# Patient Record
Sex: FEMALE | Race: WHITE | Employment: FULL TIME | URBAN - METROPOLITAN AREA
[De-identification: names, ages, dates, MRNs, and addresses within clinical notes are randomized per-mention and may not be internally consistent; named-entity substitution may affect disease eponyms.]

---

## 2021-09-22 ENCOUNTER — APPOINTMENT (OUTPATIENT)
Dept: GENERAL RADIOLOGY | Age: 41
DRG: 177 | End: 2021-09-22
Payer: COMMERCIAL

## 2021-09-22 ENCOUNTER — HOSPITAL ENCOUNTER (INPATIENT)
Age: 41
LOS: 2 days | Discharge: HOME | DRG: 177 | End: 2021-09-24
Attending: EMERGENCY MEDICINE | Admitting: INTERNAL MEDICINE
Payer: COMMERCIAL

## 2021-09-22 DIAGNOSIS — J96.01 ACUTE RESPIRATORY FAILURE WITH HYPOXIA (HCC): Primary | ICD-10-CM

## 2021-09-22 DIAGNOSIS — J81.0 ACUTE PULMONARY EDEMA (HCC): ICD-10-CM

## 2021-09-22 DIAGNOSIS — E87.1 HYPONATREMIA: ICD-10-CM

## 2021-09-22 DIAGNOSIS — U07.1 COVID-19 VIRUS INFECTION: ICD-10-CM

## 2021-09-22 DIAGNOSIS — R74.01 TRANSAMINITIS: ICD-10-CM

## 2021-09-22 LAB
A/G RATIO: 1.3 (ref 1.1–2.2)
ALBUMIN SERPL-MCNC: 4.3 G/DL (ref 3.4–5)
ALP BLD-CCNC: 57 U/L (ref 40–129)
ALT SERPL-CCNC: 48 U/L (ref 10–40)
ANION GAP SERPL CALCULATED.3IONS-SCNC: 14 MMOL/L (ref 3–16)
AST SERPL-CCNC: 76 U/L (ref 15–37)
BASE EXCESS VENOUS: -3.1 MMOL/L (ref -3–3)
BASOPHILS ABSOLUTE: 0.1 K/UL (ref 0–0.2)
BASOPHILS RELATIVE PERCENT: 0.8 %
BILIRUB SERPL-MCNC: <0.2 MG/DL (ref 0–1)
BUN BLDV-MCNC: 10 MG/DL (ref 7–20)
CALCIUM SERPL-MCNC: 9.2 MG/DL (ref 8.3–10.6)
CARBOXYHEMOGLOBIN: 1.7 % (ref 0–1.5)
CHLORIDE BLD-SCNC: 91 MMOL/L (ref 99–110)
CO2: 22 MMOL/L (ref 21–32)
CREAT SERPL-MCNC: 0.8 MG/DL (ref 0.6–1.1)
EOSINOPHILS ABSOLUTE: 0 K/UL (ref 0–0.6)
EOSINOPHILS RELATIVE PERCENT: 0.2 %
GFR AFRICAN AMERICAN: >60
GFR NON-AFRICAN AMERICAN: >60
GLOBULIN: 3.2 G/DL
GLUCOSE BLD-MCNC: 184 MG/DL (ref 70–99)
HCO3 VENOUS: 24.2 MMOL/L (ref 23–29)
HCT VFR BLD CALC: 38.9 % (ref 36–48)
HEMOGLOBIN: 13.2 G/DL (ref 12–16)
LYMPHOCYTES ABSOLUTE: 1 K/UL (ref 1–5.1)
LYMPHOCYTES RELATIVE PERCENT: 10.6 %
MCH RBC QN AUTO: 31.5 PG (ref 26–34)
MCHC RBC AUTO-ENTMCNC: 33.9 G/DL (ref 31–36)
MCV RBC AUTO: 93 FL (ref 80–100)
METHEMOGLOBIN VENOUS: 0.4 %
MONOCYTES ABSOLUTE: 0.6 K/UL (ref 0–1.3)
MONOCYTES RELATIVE PERCENT: 5.7 %
NEUTROPHILS ABSOLUTE: 8.1 K/UL (ref 1.7–7.7)
NEUTROPHILS RELATIVE PERCENT: 82.7 %
O2 SAT, VEN: 51 %
O2 THERAPY: ABNORMAL
PCO2, VEN: 52 MMHG (ref 40–50)
PDW BLD-RTO: 14.1 % (ref 12.4–15.4)
PH VENOUS: 7.29 (ref 7.35–7.45)
PLATELET # BLD: 245 K/UL (ref 135–450)
PMV BLD AUTO: 8.5 FL (ref 5–10.5)
PO2, VEN: 30.4 MMHG (ref 25–40)
POTASSIUM REFLEX MAGNESIUM: 4.5 MMOL/L (ref 3.5–5.1)
PRO-BNP: 4602 PG/ML (ref 0–124)
PROCALCITONIN: 0.09 NG/ML (ref 0–0.15)
RBC # BLD: 4.19 M/UL (ref 4–5.2)
SARS-COV-2, NAAT: DETECTED
SODIUM BLD-SCNC: 127 MMOL/L (ref 136–145)
TCO2 CALC VENOUS: 26 MMOL/L
TOTAL PROTEIN: 7.5 G/DL (ref 6.4–8.2)
TROPONIN: 0.01 NG/ML
WBC # BLD: 9.8 K/UL (ref 4–11)

## 2021-09-22 PROCEDURE — 93005 ELECTROCARDIOGRAM TRACING: CPT | Performed by: EMERGENCY MEDICINE

## 2021-09-22 PROCEDURE — 96374 THER/PROPH/DIAG INJ IV PUSH: CPT

## 2021-09-22 PROCEDURE — 87635 SARS-COV-2 COVID-19 AMP PRB: CPT

## 2021-09-22 PROCEDURE — 83880 ASSAY OF NATRIURETIC PEPTIDE: CPT

## 2021-09-22 PROCEDURE — 80053 COMPREHEN METABOLIC PANEL: CPT

## 2021-09-22 PROCEDURE — 96375 TX/PRO/DX INJ NEW DRUG ADDON: CPT

## 2021-09-22 PROCEDURE — 2060000000 HC ICU INTERMEDIATE R&B

## 2021-09-22 PROCEDURE — 6360000002 HC RX W HCPCS: Performed by: INTERNAL MEDICINE

## 2021-09-22 PROCEDURE — 84484 ASSAY OF TROPONIN QUANT: CPT

## 2021-09-22 PROCEDURE — 94761 N-INVAS EAR/PLS OXIMETRY MLT: CPT

## 2021-09-22 PROCEDURE — 6360000002 HC RX W HCPCS: Performed by: PHYSICIAN ASSISTANT

## 2021-09-22 PROCEDURE — 2700000000 HC OXYGEN THERAPY PER DAY

## 2021-09-22 PROCEDURE — 82803 BLOOD GASES ANY COMBINATION: CPT

## 2021-09-22 PROCEDURE — 6360000002 HC RX W HCPCS

## 2021-09-22 PROCEDURE — 85025 COMPLETE CBC W/AUTO DIFF WBC: CPT

## 2021-09-22 PROCEDURE — 84703 CHORIONIC GONADOTROPIN ASSAY: CPT

## 2021-09-22 PROCEDURE — 2580000003 HC RX 258: Performed by: PHYSICIAN ASSISTANT

## 2021-09-22 PROCEDURE — 36415 COLL VENOUS BLD VENIPUNCTURE: CPT

## 2021-09-22 PROCEDURE — 84145 PROCALCITONIN (PCT): CPT

## 2021-09-22 PROCEDURE — 71045 X-RAY EXAM CHEST 1 VIEW: CPT

## 2021-09-22 PROCEDURE — 83036 HEMOGLOBIN GLYCOSYLATED A1C: CPT

## 2021-09-22 PROCEDURE — 99285 EMERGENCY DEPT VISIT HI MDM: CPT

## 2021-09-22 PROCEDURE — 6370000000 HC RX 637 (ALT 250 FOR IP): Performed by: PHYSICIAN ASSISTANT

## 2021-09-22 RX ORDER — POTASSIUM CHLORIDE 7.45 MG/ML
10 INJECTION INTRAVENOUS PRN
Status: DISCONTINUED | OUTPATIENT
Start: 2021-09-22 | End: 2021-09-24 | Stop reason: HOSPADM

## 2021-09-22 RX ORDER — ACETAMINOPHEN 500 MG
1000 TABLET ORAL ONCE
Status: COMPLETED | OUTPATIENT
Start: 2021-09-22 | End: 2021-09-22

## 2021-09-22 RX ORDER — LORAZEPAM 2 MG/ML
0.5 INJECTION INTRAMUSCULAR ONCE
Status: DISCONTINUED | OUTPATIENT
Start: 2021-09-22 | End: 2021-09-22

## 2021-09-22 RX ORDER — HYDROXYZINE HYDROCHLORIDE 50 MG/ML
50 INJECTION, SOLUTION INTRAMUSCULAR ONCE
Status: COMPLETED | OUTPATIENT
Start: 2021-09-23 | End: 2021-09-23

## 2021-09-22 RX ORDER — DEXTROSE MONOHYDRATE 50 MG/ML
100 INJECTION, SOLUTION INTRAVENOUS PRN
Status: DISCONTINUED | OUTPATIENT
Start: 2021-09-22 | End: 2021-09-24 | Stop reason: HOSPADM

## 2021-09-22 RX ORDER — ZINC SULFATE 50(220)MG
50 CAPSULE ORAL DAILY
COMMUNITY

## 2021-09-22 RX ORDER — 0.9 % SODIUM CHLORIDE 0.9 %
1000 INTRAVENOUS SOLUTION INTRAVENOUS ONCE
Status: DISCONTINUED | OUTPATIENT
Start: 2021-09-22 | End: 2021-09-22

## 2021-09-22 RX ORDER — FUROSEMIDE 10 MG/ML
80 INJECTION INTRAMUSCULAR; INTRAVENOUS ONCE
Status: DISCONTINUED | OUTPATIENT
Start: 2021-09-23 | End: 2021-09-23

## 2021-09-22 RX ORDER — METHYLPREDNISOLONE SODIUM SUCCINATE 125 MG/2ML
60 INJECTION, POWDER, LYOPHILIZED, FOR SOLUTION INTRAMUSCULAR; INTRAVENOUS ONCE
Status: COMPLETED | OUTPATIENT
Start: 2021-09-22 | End: 2021-09-22

## 2021-09-22 RX ORDER — NICOTINE POLACRILEX 4 MG
15 LOZENGE BUCCAL PRN
Status: DISCONTINUED | OUTPATIENT
Start: 2021-09-22 | End: 2021-09-24 | Stop reason: HOSPADM

## 2021-09-22 RX ORDER — PROMETHAZINE HYDROCHLORIDE 25 MG/1
12.5 TABLET ORAL EVERY 6 HOURS PRN
Status: DISCONTINUED | OUTPATIENT
Start: 2021-09-22 | End: 2021-09-24 | Stop reason: HOSPADM

## 2021-09-22 RX ORDER — NITROGLYCERIN 0.4 MG/1
0.4 TABLET SUBLINGUAL ONCE
Status: DISCONTINUED | OUTPATIENT
Start: 2021-09-22 | End: 2021-09-23

## 2021-09-22 RX ORDER — FUROSEMIDE 10 MG/ML
40 INJECTION INTRAMUSCULAR; INTRAVENOUS ONCE
Status: COMPLETED | OUTPATIENT
Start: 2021-09-22 | End: 2021-09-22

## 2021-09-22 RX ORDER — ONDANSETRON 2 MG/ML
4 INJECTION INTRAMUSCULAR; INTRAVENOUS EVERY 6 HOURS PRN
Status: DISCONTINUED | OUTPATIENT
Start: 2021-09-22 | End: 2021-09-24 | Stop reason: HOSPADM

## 2021-09-22 RX ORDER — DEXAMETHASONE SODIUM PHOSPHATE 10 MG/ML
6 INJECTION INTRAMUSCULAR; INTRAVENOUS EVERY 24 HOURS
Status: DISCONTINUED | OUTPATIENT
Start: 2021-09-23 | End: 2021-09-23

## 2021-09-22 RX ORDER — GUAIFENESIN/DEXTROMETHORPHAN 100-10MG/5
5 SYRUP ORAL EVERY 4 HOURS PRN
Status: DISCONTINUED | OUTPATIENT
Start: 2021-09-22 | End: 2021-09-24 | Stop reason: HOSPADM

## 2021-09-22 RX ORDER — VITAMIN B COMPLEX
2000 TABLET ORAL DAILY
Status: DISCONTINUED | OUTPATIENT
Start: 2021-09-23 | End: 2021-09-24 | Stop reason: HOSPADM

## 2021-09-22 RX ORDER — SODIUM CHLORIDE 9 MG/ML
25 INJECTION, SOLUTION INTRAVENOUS PRN
Status: DISCONTINUED | OUTPATIENT
Start: 2021-09-22 | End: 2021-09-24 | Stop reason: HOSPADM

## 2021-09-22 RX ORDER — BENZONATATE 100 MG/1
200 CAPSULE ORAL 3 TIMES DAILY
Status: DISCONTINUED | OUTPATIENT
Start: 2021-09-22 | End: 2021-09-24 | Stop reason: HOSPADM

## 2021-09-22 RX ORDER — SODIUM CHLORIDE 0.9 % (FLUSH) 0.9 %
10 SYRINGE (ML) INJECTION PRN
Status: DISCONTINUED | OUTPATIENT
Start: 2021-09-22 | End: 2021-09-24 | Stop reason: HOSPADM

## 2021-09-22 RX ORDER — DEXTROSE MONOHYDRATE 25 G/50ML
12.5 INJECTION, SOLUTION INTRAVENOUS PRN
Status: DISCONTINUED | OUTPATIENT
Start: 2021-09-22 | End: 2021-09-24 | Stop reason: HOSPADM

## 2021-09-22 RX ORDER — MAGNESIUM SULFATE IN WATER 40 MG/ML
2000 INJECTION, SOLUTION INTRAVENOUS PRN
Status: DISCONTINUED | OUTPATIENT
Start: 2021-09-22 | End: 2021-09-24 | Stop reason: HOSPADM

## 2021-09-22 RX ORDER — SODIUM CHLORIDE 0.9 % (FLUSH) 0.9 %
10 SYRINGE (ML) INJECTION EVERY 12 HOURS SCHEDULED
Status: DISCONTINUED | OUTPATIENT
Start: 2021-09-22 | End: 2021-09-24 | Stop reason: HOSPADM

## 2021-09-22 RX ORDER — MORPHINE SULFATE 2 MG/ML
INJECTION, SOLUTION INTRAMUSCULAR; INTRAVENOUS
Status: COMPLETED
Start: 2021-09-22 | End: 2021-09-22

## 2021-09-22 RX ADMIN — FUROSEMIDE 40 MG: 10 INJECTION, SOLUTION INTRAMUSCULAR; INTRAVENOUS at 23:33

## 2021-09-22 RX ADMIN — SODIUM CHLORIDE 1000 ML: 9 INJECTION, SOLUTION INTRAVENOUS at 22:24

## 2021-09-22 RX ADMIN — MORPHINE SULFATE 2 MG: 2 INJECTION, SOLUTION INTRAMUSCULAR; INTRAVENOUS at 23:51

## 2021-09-22 RX ADMIN — METHYLPREDNISOLONE SODIUM SUCCINATE 60 MG: 125 INJECTION, POWDER, FOR SOLUTION INTRAMUSCULAR; INTRAVENOUS at 22:26

## 2021-09-22 RX ADMIN — ACETAMINOPHEN 1000 MG: 500 TABLET ORAL at 22:25

## 2021-09-22 ASSESSMENT — PAIN SCALES - GENERAL
PAINLEVEL_OUTOF10: 0
PAINLEVEL_OUTOF10: 0

## 2021-09-22 NOTE — Clinical Note
Patient Class: Inpatient [101]   REQUIRED: Diagnosis: Acute respiratory failure with hypoxia ([facility] [address] [[id number]]   Estimated Length of Stay: Estimated stay of more than 2 midnights   Admitting Provider: Jana Stanford [[id number]]   Telemetry/Cardiac Monitoring Required?: Yes

## 2021-09-23 ENCOUNTER — APPOINTMENT (OUTPATIENT)
Dept: CT IMAGING | Age: 41
DRG: 177 | End: 2021-09-23
Payer: COMMERCIAL

## 2021-09-23 LAB
A/G RATIO: 1.1 (ref 1.1–2.2)
A/G RATIO: 1.1 (ref 1.1–2.2)
ALBUMIN SERPL-MCNC: 3.4 G/DL (ref 3.4–5)
ALBUMIN SERPL-MCNC: 3.4 G/DL (ref 3.4–5)
ALP BLD-CCNC: 54 U/L (ref 40–129)
ALP BLD-CCNC: 55 U/L (ref 40–129)
ALT SERPL-CCNC: 79 U/L (ref 10–40)
ALT SERPL-CCNC: 79 U/L (ref 10–40)
ANION GAP SERPL CALCULATED.3IONS-SCNC: 10 MMOL/L (ref 3–16)
ANION GAP SERPL CALCULATED.3IONS-SCNC: 11 MMOL/L (ref 3–16)
ANION GAP SERPL CALCULATED.3IONS-SCNC: 11 MMOL/L (ref 3–16)
ANION GAP SERPL CALCULATED.3IONS-SCNC: 9 MMOL/L (ref 3–16)
APTT: 34.9 SEC (ref 26.2–38.6)
AST SERPL-CCNC: 98 U/L (ref 15–37)
AST SERPL-CCNC: 99 U/L (ref 15–37)
BASE EXCESS VENOUS: -2 MMOL/L (ref -3–3)
BASOPHILS ABSOLUTE: 0 K/UL (ref 0–0.2)
BASOPHILS ABSOLUTE: 0 K/UL (ref 0–0.2)
BASOPHILS RELATIVE PERCENT: 0.2 %
BASOPHILS RELATIVE PERCENT: 0.2 %
BILIRUB SERPL-MCNC: <0.2 MG/DL (ref 0–1)
BILIRUB SERPL-MCNC: <0.2 MG/DL (ref 0–1)
BUN BLDV-MCNC: 15 MG/DL (ref 7–20)
BUN BLDV-MCNC: 15 MG/DL (ref 7–20)
BUN BLDV-MCNC: 16 MG/DL (ref 7–20)
BUN BLDV-MCNC: 20 MG/DL (ref 7–20)
C-REACTIVE PROTEIN: 13.6 MG/L (ref 0–5.1)
CALCIUM SERPL-MCNC: 8.1 MG/DL (ref 8.3–10.6)
CALCIUM SERPL-MCNC: 8.2 MG/DL (ref 8.3–10.6)
CARBOXYHEMOGLOBIN: 1.3 % (ref 0–1.5)
CHLORIDE BLD-SCNC: 91 MMOL/L (ref 99–110)
CHLORIDE BLD-SCNC: 95 MMOL/L (ref 99–110)
CHLORIDE BLD-SCNC: 95 MMOL/L (ref 99–110)
CHLORIDE BLD-SCNC: 96 MMOL/L (ref 99–110)
CO2: 23 MMOL/L (ref 21–32)
CO2: 23 MMOL/L (ref 21–32)
CO2: 24 MMOL/L (ref 21–32)
CO2: 24 MMOL/L (ref 21–32)
CREAT SERPL-MCNC: 0.8 MG/DL (ref 0.6–1.1)
CREAT SERPL-MCNC: 0.8 MG/DL (ref 0.6–1.1)
CREAT SERPL-MCNC: 0.9 MG/DL (ref 0.6–1.1)
CREAT SERPL-MCNC: 0.9 MG/DL (ref 0.6–1.1)
D DIMER: 623 NG/ML DDU (ref 0–229)
EKG ATRIAL RATE: 131 BPM
EKG ATRIAL RATE: 90 BPM
EKG DIAGNOSIS: NORMAL
EKG DIAGNOSIS: NORMAL
EKG P AXIS: 77 DEGREES
EKG P AXIS: 79 DEGREES
EKG P-R INTERVAL: 118 MS
EKG P-R INTERVAL: 124 MS
EKG Q-T INTERVAL: 300 MS
EKG Q-T INTERVAL: 420 MS
EKG QRS DURATION: 70 MS
EKG QRS DURATION: 82 MS
EKG QTC CALCULATION (BAZETT): 443 MS
EKG QTC CALCULATION (BAZETT): 513 MS
EKG R AXIS: 66 DEGREES
EKG R AXIS: 72 DEGREES
EKG T AXIS: -74 DEGREES
EKG T AXIS: 125 DEGREES
EKG VENTRICULAR RATE: 131 BPM
EKG VENTRICULAR RATE: 90 BPM
EOSINOPHILS ABSOLUTE: 0 K/UL (ref 0–0.6)
EOSINOPHILS ABSOLUTE: 0 K/UL (ref 0–0.6)
EOSINOPHILS RELATIVE PERCENT: 0 %
EOSINOPHILS RELATIVE PERCENT: 0 %
ESTIMATED AVERAGE GLUCOSE: 125.5 MG/DL
FERRITIN: 74.1 NG/ML (ref 15–150)
FIBRINOGEN: 345 MG/DL (ref 200–397)
GFR AFRICAN AMERICAN: >60
GFR NON-AFRICAN AMERICAN: >60
GLOBULIN: 3 G/DL
GLOBULIN: 3 G/DL
GLUCOSE BLD-MCNC: 150 MG/DL (ref 70–99)
GLUCOSE BLD-MCNC: 200 MG/DL (ref 70–99)
GLUCOSE BLD-MCNC: 206 MG/DL (ref 70–99)
GLUCOSE BLD-MCNC: 208 MG/DL (ref 70–99)
GLUCOSE BLD-MCNC: 210 MG/DL (ref 70–99)
GLUCOSE BLD-MCNC: 211 MG/DL (ref 70–99)
GLUCOSE BLD-MCNC: 215 MG/DL (ref 70–99)
GLUCOSE BLD-MCNC: 235 MG/DL (ref 70–99)
HBA1C MFR BLD: 6 %
HCG QUALITATIVE: NEGATIVE
HCO3 VENOUS: 23.9 MMOL/L (ref 23–29)
HCT VFR BLD CALC: 36.8 % (ref 36–48)
HCT VFR BLD CALC: 37.2 % (ref 36–48)
HEMOGLOBIN: 12.2 G/DL (ref 12–16)
HEMOGLOBIN: 12.4 G/DL (ref 12–16)
INR BLD: 1.02 (ref 0.88–1.12)
L. PNEUMOPHILA SEROGP 1 UR AG: NORMAL
LACTATE DEHYDROGENASE: 333 U/L (ref 100–190)
LACTIC ACID: 2 MMOL/L (ref 0.4–2)
LYMPHOCYTES ABSOLUTE: 0.5 K/UL (ref 1–5.1)
LYMPHOCYTES ABSOLUTE: 0.5 K/UL (ref 1–5.1)
LYMPHOCYTES RELATIVE PERCENT: 6.2 %
LYMPHOCYTES RELATIVE PERCENT: 6.3 %
MCH RBC QN AUTO: 31.3 PG (ref 26–34)
MCH RBC QN AUTO: 31.4 PG (ref 26–34)
MCHC RBC AUTO-ENTMCNC: 33.3 G/DL (ref 31–36)
MCHC RBC AUTO-ENTMCNC: 33.4 G/DL (ref 31–36)
MCV RBC AUTO: 93.6 FL (ref 80–100)
MCV RBC AUTO: 94.3 FL (ref 80–100)
METHEMOGLOBIN VENOUS: 0.7 %
MONOCYTES ABSOLUTE: 0.2 K/UL (ref 0–1.3)
MONOCYTES ABSOLUTE: 0.2 K/UL (ref 0–1.3)
MONOCYTES RELATIVE PERCENT: 2.7 %
MONOCYTES RELATIVE PERCENT: 3.1 %
NEUTROPHILS ABSOLUTE: 7.4 K/UL (ref 1.7–7.7)
NEUTROPHILS ABSOLUTE: 7.6 K/UL (ref 1.7–7.7)
NEUTROPHILS RELATIVE PERCENT: 90.5 %
NEUTROPHILS RELATIVE PERCENT: 90.8 %
O2 SAT, VEN: 89 %
O2 THERAPY: ABNORMAL
PCO2, VEN: 45 MMHG (ref 40–50)
PDW BLD-RTO: 14.2 % (ref 12.4–15.4)
PDW BLD-RTO: 14.3 % (ref 12.4–15.4)
PERFORMED ON: ABNORMAL
PH VENOUS: 7.34 (ref 7.35–7.45)
PLATELET # BLD: 199 K/UL (ref 135–450)
PLATELET # BLD: 203 K/UL (ref 135–450)
PMV BLD AUTO: 7.9 FL (ref 5–10.5)
PMV BLD AUTO: 8.1 FL (ref 5–10.5)
PO2, VEN: 57.6 MMHG (ref 25–40)
POTASSIUM REFLEX MAGNESIUM: 4.9 MMOL/L (ref 3.5–5.1)
POTASSIUM REFLEX MAGNESIUM: 5 MMOL/L (ref 3.5–5.1)
POTASSIUM SERPL-SCNC: 4.4 MMOL/L (ref 3.5–5.1)
POTASSIUM SERPL-SCNC: 4.6 MMOL/L (ref 3.5–5.1)
PROCALCITONIN: 0.1 NG/ML (ref 0–0.15)
PROCALCITONIN: 2.31 NG/ML (ref 0–0.15)
PROTHROMBIN TIME: 11.5 SEC (ref 9.9–12.7)
RBC # BLD: 3.9 M/UL (ref 4–5.2)
RBC # BLD: 3.97 M/UL (ref 4–5.2)
SODIUM BLD-SCNC: 125 MMOL/L (ref 136–145)
SODIUM BLD-SCNC: 128 MMOL/L (ref 136–145)
SODIUM BLD-SCNC: 129 MMOL/L (ref 136–145)
SODIUM BLD-SCNC: 130 MMOL/L (ref 136–145)
STREP PNEUMONIAE ANTIGEN, URINE: NORMAL
TCO2 CALC VENOUS: 25 MMOL/L
TOTAL PROTEIN: 6.4 G/DL (ref 6.4–8.2)
TOTAL PROTEIN: 6.4 G/DL (ref 6.4–8.2)
TROPONIN: 0.02 NG/ML
TROPONIN: 0.03 NG/ML
TROPONIN: 0.04 NG/ML
WBC # BLD: 8.1 K/UL (ref 4–11)
WBC # BLD: 8.4 K/UL (ref 4–11)

## 2021-09-23 PROCEDURE — 85384 FIBRINOGEN ACTIVITY: CPT

## 2021-09-23 PROCEDURE — 93010 ELECTROCARDIOGRAM REPORT: CPT | Performed by: INTERNAL MEDICINE

## 2021-09-23 PROCEDURE — 2580000003 HC RX 258: Performed by: INTERNAL MEDICINE

## 2021-09-23 PROCEDURE — 83605 ASSAY OF LACTIC ACID: CPT

## 2021-09-23 PROCEDURE — 6360000002 HC RX W HCPCS: Performed by: INTERNAL MEDICINE

## 2021-09-23 PROCEDURE — 6370000000 HC RX 637 (ALT 250 FOR IP)

## 2021-09-23 PROCEDURE — 83615 LACTATE (LD) (LDH) ENZYME: CPT

## 2021-09-23 PROCEDURE — 85379 FIBRIN DEGRADATION QUANT: CPT

## 2021-09-23 PROCEDURE — 8E0ZXY6 ISOLATION: ICD-10-PCS

## 2021-09-23 PROCEDURE — 86140 C-REACTIVE PROTEIN: CPT

## 2021-09-23 PROCEDURE — 6370000000 HC RX 637 (ALT 250 FOR IP): Performed by: INTERNAL MEDICINE

## 2021-09-23 PROCEDURE — 36415 COLL VENOUS BLD VENIPUNCTURE: CPT

## 2021-09-23 PROCEDURE — XW033E5 INTRODUCTION OF REMDESIVIR ANTI-INFECTIVE INTO PERIPHERAL VEIN, PERCUTANEOUS APPROACH, NEW TECHNOLOGY GROUP 5: ICD-10-PCS

## 2021-09-23 PROCEDURE — 6370000000 HC RX 637 (ALT 250 FOR IP): Performed by: NURSE PRACTITIONER

## 2021-09-23 PROCEDURE — 85730 THROMBOPLASTIN TIME PARTIAL: CPT

## 2021-09-23 PROCEDURE — 82306 VITAMIN D 25 HYDROXY: CPT

## 2021-09-23 PROCEDURE — 94761 N-INVAS EAR/PLS OXIMETRY MLT: CPT

## 2021-09-23 PROCEDURE — 85610 PROTHROMBIN TIME: CPT

## 2021-09-23 PROCEDURE — 1200000000 HC SEMI PRIVATE

## 2021-09-23 PROCEDURE — 84145 PROCALCITONIN (PCT): CPT

## 2021-09-23 PROCEDURE — 82803 BLOOD GASES ANY COMBINATION: CPT

## 2021-09-23 PROCEDURE — 94660 CPAP INITIATION&MGMT: CPT

## 2021-09-23 PROCEDURE — 6360000004 HC RX CONTRAST MEDICATION: Performed by: EMERGENCY MEDICINE

## 2021-09-23 PROCEDURE — 71260 CT THORAX DX C+: CPT

## 2021-09-23 PROCEDURE — 80053 COMPREHEN METABOLIC PANEL: CPT

## 2021-09-23 PROCEDURE — 99254 IP/OBS CNSLTJ NEW/EST MOD 60: CPT | Performed by: INTERNAL MEDICINE

## 2021-09-23 PROCEDURE — 87449 NOS EACH ORGANISM AG IA: CPT

## 2021-09-23 PROCEDURE — 93005 ELECTROCARDIOGRAM TRACING: CPT

## 2021-09-23 PROCEDURE — 82728 ASSAY OF FERRITIN: CPT

## 2021-09-23 PROCEDURE — 85025 COMPLETE CBC W/AUTO DIFF WBC: CPT

## 2021-09-23 PROCEDURE — 2700000000 HC OXYGEN THERAPY PER DAY

## 2021-09-23 PROCEDURE — 84484 ASSAY OF TROPONIN QUANT: CPT

## 2021-09-23 RX ORDER — ACETAMINOPHEN 325 MG/1
650 TABLET ORAL EVERY 4 HOURS PRN
Status: DISCONTINUED | OUTPATIENT
Start: 2021-09-23 | End: 2021-09-24 | Stop reason: HOSPADM

## 2021-09-23 RX ORDER — ASPIRIN 81 MG/1
162 TABLET ORAL ONCE
Status: DISCONTINUED | OUTPATIENT
Start: 2021-09-23 | End: 2021-09-23

## 2021-09-23 RX ORDER — ASPIRIN 300 MG/1
300 SUPPOSITORY RECTAL ONCE
Status: DISCONTINUED | OUTPATIENT
Start: 2021-09-23 | End: 2021-09-23

## 2021-09-23 RX ORDER — FUROSEMIDE 10 MG/ML
40 INJECTION INTRAMUSCULAR; INTRAVENOUS ONCE
Status: COMPLETED | OUTPATIENT
Start: 2021-09-23 | End: 2021-09-23

## 2021-09-23 RX ORDER — HYDROXYZINE HYDROCHLORIDE 50 MG/ML
50 INJECTION, SOLUTION INTRAMUSCULAR ONCE
Status: DISCONTINUED | OUTPATIENT
Start: 2021-09-23 | End: 2021-09-23

## 2021-09-23 RX ORDER — METHYLPREDNISOLONE SODIUM SUCCINATE 125 MG/2ML
60 INJECTION, POWDER, LYOPHILIZED, FOR SOLUTION INTRAMUSCULAR; INTRAVENOUS ONCE
Status: DISCONTINUED | OUTPATIENT
Start: 2021-09-23 | End: 2021-09-23

## 2021-09-23 RX ORDER — ASPIRIN 81 MG/1
162 TABLET ORAL ONCE
Status: COMPLETED | OUTPATIENT
Start: 2021-09-23 | End: 2021-09-23

## 2021-09-23 RX ORDER — METFORMIN HYDROCHLORIDE 500 MG/1
500 TABLET, EXTENDED RELEASE ORAL
Status: DISCONTINUED | OUTPATIENT
Start: 2021-09-23 | End: 2021-09-24 | Stop reason: HOSPADM

## 2021-09-23 RX ORDER — DEXAMETHASONE 4 MG/1
6 TABLET ORAL DAILY
Status: DISCONTINUED | OUTPATIENT
Start: 2021-09-24 | End: 2021-09-23

## 2021-09-23 RX ORDER — METFORMIN HYDROCHLORIDE 500 MG/1
500 TABLET, EXTENDED RELEASE ORAL 3 TIMES DAILY
Status: DISCONTINUED | OUTPATIENT
Start: 2021-09-23 | End: 2021-09-23

## 2021-09-23 RX ORDER — FUROSEMIDE 10 MG/ML
40 INJECTION INTRAMUSCULAR; INTRAVENOUS ONCE
Status: DISCONTINUED | OUTPATIENT
Start: 2021-09-23 | End: 2021-09-23

## 2021-09-23 RX ORDER — DIPHENHYDRAMINE HCL 25 MG
50 TABLET ORAL NIGHTLY PRN
Status: DISCONTINUED | OUTPATIENT
Start: 2021-09-23 | End: 2021-09-24 | Stop reason: HOSPADM

## 2021-09-23 RX ORDER — HYDROXYZINE HYDROCHLORIDE 10 MG/1
25 TABLET, FILM COATED ORAL 3 TIMES DAILY PRN
Status: DISCONTINUED | OUTPATIENT
Start: 2021-09-23 | End: 2021-09-24 | Stop reason: HOSPADM

## 2021-09-23 RX ORDER — ACETAMINOPHEN 500 MG
1000 TABLET ORAL ONCE
Status: DISCONTINUED | OUTPATIENT
Start: 2021-09-23 | End: 2021-09-23

## 2021-09-23 RX ADMIN — FUROSEMIDE 40 MG: 10 INJECTION, SOLUTION INTRAMUSCULAR; INTRAVENOUS at 00:22

## 2021-09-23 RX ADMIN — ACETAMINOPHEN 650 MG: 325 TABLET ORAL at 20:46

## 2021-09-23 RX ADMIN — Medication 10 ML: at 08:36

## 2021-09-23 RX ADMIN — METFORMIN HYDROCHLORIDE 500 MG: 500 TABLET, EXTENDED RELEASE ORAL at 13:34

## 2021-09-23 RX ADMIN — HYDROXYZINE HYDROCHLORIDE 50 MG: 50 INJECTION, SOLUTION INTRAMUSCULAR at 01:25

## 2021-09-23 RX ADMIN — DEXAMETHASONE SODIUM PHOSPHATE 6 MG: 10 INJECTION INTRAMUSCULAR; INTRAVENOUS at 08:34

## 2021-09-23 RX ADMIN — ENOXAPARIN SODIUM 30 MG: 30 INJECTION SUBCUTANEOUS at 00:22

## 2021-09-23 RX ADMIN — ENOXAPARIN SODIUM 30 MG: 30 INJECTION SUBCUTANEOUS at 20:46

## 2021-09-23 RX ADMIN — ENOXAPARIN SODIUM 30 MG: 30 INJECTION SUBCUTANEOUS at 08:35

## 2021-09-23 RX ADMIN — HYDROXYZINE HYDROCHLORIDE 25 MG: 10 TABLET ORAL at 20:58

## 2021-09-23 RX ADMIN — BENZONATATE 200 MG: 100 CAPSULE ORAL at 08:35

## 2021-09-23 RX ADMIN — BENZONATATE 200 MG: 100 CAPSULE ORAL at 20:46

## 2021-09-23 RX ADMIN — IOPAMIDOL 75 ML: 755 INJECTION, SOLUTION INTRAVENOUS at 04:49

## 2021-09-23 RX ADMIN — Medication 10 ML: at 01:28

## 2021-09-23 RX ADMIN — Medication 2000 UNITS: at 08:35

## 2021-09-23 RX ADMIN — Medication 10 ML: at 20:47

## 2021-09-23 RX ADMIN — ASPIRIN 162 MG: 81 TABLET, COATED ORAL at 08:35

## 2021-09-23 RX ADMIN — BENZONATATE 200 MG: 100 CAPSULE ORAL at 13:34

## 2021-09-23 ASSESSMENT — PAIN SCALES - GENERAL
PAINLEVEL_OUTOF10: 6
PAINLEVEL_OUTOF10: 0

## 2021-09-23 ASSESSMENT — ENCOUNTER SYMPTOMS
NAUSEA: 0
COLOR CHANGE: 0
COUGH: 1
SHORTNESS OF BREATH: 1
BACK PAIN: 0
EYES NEGATIVE: 1
ABDOMINAL PAIN: 0
VOMITING: 0

## 2021-09-23 NOTE — PROGRESS NOTES
Patient arrived from ED to room 422. Chest CT was completed prior to arrival. Pt. A&OX4, VSS, She was on a nonrebreather SpO2 100%. Patient transitioned to high flow nasal cannula currently 6L SpO2 98%. Safety precautions in place.  Will continue to monitor

## 2021-09-23 NOTE — H&P
History & Physical      PCP: Liliaan Drake MD    Date of Admission: 9/22/2021    Date of Service: Pt seen/examined on 9/23/21 and Admitted to Inpatient with expected LOS greater than two midnights due to medical therapy. Chief Complaint:  Feeling ill since 9/20/21 with fatigue, low-grade fever, cough, with progressive SOB      History Of Present Illness:  36 y.o. female with Hx HTN, DM, HLD, interstitial cystitis who presented to [facility] with SOB, coughing, congestion, and generalized fatigue concerns after home O2 of 80%. Also has felt fever/chills, some chest pain associated with coughing, and headache. Smokes 1ppd. Denies alcohol or recreational drug use. No sick contacts. Never vaccinated against COVID. Patient seen in room this morning, stating she is still SOB but better than yesterday. On 4L NC satting %. Weaned down to RA while in room. Still very tired. She does state chronic issue of SHARP and leg swelling over couple years. ED course: Afebrile, /102,  COVID positive. Troponin 0.01, 0.02. VBG pH 7.285, PCO2 52. Hb 13.2, WBC 9.8, Na 127, blood sugar 184. proBNP 4602, Procalcitonin 0.09, AST 76, ALT 48. CXR showed mild cardiomegaly, mild pulmonary edema, hazy perihilar and bibasilar opacities possibly due to pulm edema or infiltrates from pneumonia. Possibly small bibasilar pleural effusions. CT chest negative for PE, showed bilateral dense consolidation, trace pleural effusions, mild cardiomegaly. EKG sinus tachycardia, possible inferolateral ischemia. Given IV Solu-Medrol 60 mg, PO Tylenol 1 g. No fluids given due to pulm edema concerns. Admitted for management of severe COVID pneumonia and acute hyponatremia.         Past Medical History:          Diagnosis Date    COVID-19 09/22/2021    Diabetes mellitus ([address]     Hyperlipidemia     Hypertension     Interstitial cystitis     Interstitial cystitis        Past Surgical History:          Procedure Laterality Date  ABDOMEN SURGERY      CHOLECYSTECTOMY      ENDOMETRIAL ABLATION      VULVA SURGERY  3/22/11    irrigation and debridement of right vulva       Medications Prior to Admission:      Prior to Admission medications    Medication Sig Start Date End Date Taking? Authorizing Provider   zinc sulfate (ZINCATE) 220 (50 Zn) MG capsule Take 50 mg by mouth daily   Yes Historical Provider, MD   metformin (GLUCOPHAGE-XR) 500 MG XR tablet Take 500 mg by mouth 3 times daily. 3/22/11  Yes Historical Provider, MD   lisinopril (PRINIVIL;ZESTRIL) 10 MG tablet Take 10 mg by mouth daily. Yes Historical Provider, MD   lovastatin (MEVACOR) 20 MG tablet Take 20 mg by mouth daily. 3/22/11  Yes Historical Provider, MD   Multiple Vitamins-Minerals (WOMENS MULTIVITAMIN PO) Take by mouth daily    Historical Provider, MD       Allergies:  Codeine    Social History:      The patient currently lives at home, alone. TOBACCO:   reports that she has been smoking cigarettes. She has a 14.00 pack-year smoking history. She has never used smokeless tobacco.  ETOH:   reports current alcohol use. E-Cigarettes/Vaping Use     Questions Responses    E-Cigarette/Vaping Use     Start Date     Passive Exposure     Quit Date     Counseling Given     Comments             Family History:      Reviewed in detail and negative for DM, CAD, Cancer, CVA.  Positive as follows:        Problem Relation Age of Onset    Birth Defects Father     High Blood Pressure Father     High Cholesterol Father     Asthma Sister     Depression Sister     Arthritis Paternal Aunt     Diabetes Paternal Aunt     Heart Disease Paternal Aunt     High Blood Pressure Paternal Aunt     Kidney Disease Paternal Aunt     Stroke Paternal Aunt     Cancer Paternal Grandmother     Diabetes Paternal Grandmother     High Blood Pressure Paternal Grandmother     High Cholesterol Paternal Grandmother     Kidney Disease Paternal Grandmother     Cancer Paternal Jane Zendejas Diabetes Paternal Grandfather     Heart Disease Paternal Grandfather     High Blood Pressure Paternal Grandfather     High Cholesterol Paternal Grandfather        REVIEW OF SYSTEMS COMPLETED:   Pertinent positives as noted in the HPI. All other systems reviewed and negative. PHYSICAL EXAM PERFORMED:    /81   Pulse 94   Temp 98.3 °F (36.8 °C) (Oral)   Resp 16   Ht 5' 2\" (1.575 m)   Wt 109 lb (49.4 kg)   LMP 09/20/2021   SpO2 95%   BMI 19.94 kg/m²     General appearance:  No apparent distress, appears stated age and cooperative. HEENT:  Normal cephalic, atraumatic without obvious deformity. Pupils equal, round, and reactive to light. Extra ocular muscles intact. Conjunctivae/corneas clear. Neck: Supple, with full range of motion. No jugular venous distention. Trachea midline. Respiratory:  Decreased breath sounds in all lung fields. Normal respiratory effort. Now on RA, satting 95%. Clear to auscultation, bilaterally without Rales/Wheezes/Rhonchi. Cardiovascular:  Regular rate and rhythm with normal S1/S2 without murmurs, rubs or gallops. Abdomen: Soft, non-tender, non-distended with normal bowel sounds. Musculoskeletal:  No clubbing, cyanosis or edema bilaterally. Full range of motion without deformity. Skin: Skin color, texture, turgor normal.  No rashes or lesions. Neurologic:  Neurovascularly intact without any focal sensory/motor deficits.  Cranial nerves: II-XII intact, grossly non-focal.  Psychiatric:  Alert and oriented, thought content appropriate, normal insight  Capillary Refill: Brisk,3 seconds, normal  Peripheral Pulses: +2 palpable, equal bilaterally       Labs:     Recent Labs     09/22/21 2207 09/23/21 0942   WBC 9.8 8.4  8.1   HGB 13.2 12.4  12.2   HCT 38.9 37.2  36.8    203  199     Recent Labs     09/22/21 2207 09/23/21  0942 09/23/21  1133   * 128*  130* 129*   K 4.5 4.9  5.0 4.6   CL 91* 95*  96* 95*   CO2 22 24  24 23   BUN 10 15  15 16 CREATININE 0.8 0.9  0.8 0.8   CALCIUM 9.2 8.1*  8.1* 8.1*     Recent Labs     09/22/21 2207 09/23/21  0942   AST 76* 99*  98*   ALT 48* 79*  79*   BILITOT <0.2 <0.2  <0.2   ALKPHOS 57 55  54     Recent Labs     09/23/21  0941   INR 1.02     Recent Labs     09/22/21  2207 09/23/21  0029 09/23/21  1133   TROPONINI 0.01 0.02* 0.04*       Urinalysis:      Lab Results   Component Value Date    BLOODU small 11/02/2011    SPECGRAV 1.015 11/02/2011    GLUCOSEU negative 11/02/2011       Radiology:     CXR: I have reviewed the CXR with the following interpretation: Mild cardiomegaly and mild pulmonary edema. Possible associated infiltrates from pneumonia. See below. EKG:  I have reviewed the EKG with the following interpretation: Sinus tachycardia, possible left atrial enlargement. CT CHEST PULMONARY EMBOLISM W CONTRAST   Final Result   1. No evidence of acute pulmonary embolism. 2. Bilateral lung multifocal dense consolidation, greatest within the lower   lung lobes, consistent with patient reported diagnosis of COVID-19 viral   pneumonia. 3. Mild right and trace left layering posterior pleural effusions. 4. Mild cardiomegaly. XR CHEST PORTABLE   Final Result   Mild cardiomegaly and mild pulmonary edema. Hazy perihilar and bibasilar opacities which is probably related to the   pulmonary edema and/or associated infiltrates from pneumonia. Questionable small bibasilar pleural effusions. ASSESSMENT:    Active Hospital Problems    Diagnosis Date Noted    Acute respiratory failure with hypoxia ([facility] [address] [J96.01] 09/22/2021       PLAN:  36 y.o. female with Hx HTN, DM, HLD, interstitial cystitis who presented to [facility] with SOB, coughing, congestion, and generalized fatigue concerns after home O2 of 80%. Never vaccinated against COVID. Admitted for acute hypoxic respiratory failure 2/2 COVID pneumonia.     Acute hypoxic respiratory failure 2/2 Severe COVID pneumonia:  - Sx started 9/20/21, still in range for Remdesivir  - 9/22 started IV SoluMedrol 60 mg x1, next day IV Decadron 6 mg x1  - D/c IV steroid. Start PO Dexamethasone 6mg tomorrow 9/24/21.   - Weaned down to RA, satting 95-98%. Passed walk test.  - Consulted ID to assess Pt being candidate for monoclonal Ab tx  - VBG 7.343, pO2 57.6  - CRP, Vit D, ferritin pending    Severe COVID-19 Pneumonia- d/t inability to maintain sats >94% on RA  Sxs onset: 9/20/21  Vaccination status: unvaccinated  Date of positive test: 9/22/21  Goal O2 sat >90%  Remdesivir pending ID consult for possible use of Tocilizumab as the patient is presenting within 7 days of symptom onset. Dexamethasone PO  6mg started 9/22/21 for total treatment length of 10 days with the end date 10/1/21 or until medically ready for discharge which ever comes first.   If pt is having increasing increasing oxygen requirements refractory to current corticosteroid therapy and CRP is >/=75 mg/L, ID or pulmonology should be consulted for possible use of Tocilizumab. Anticoagulation: SQ Lovenox 30 mg BID  Obtain baseline CBC, BMP, LFT, CRP  Remdesevir ordered   Trend CRP daily  Isolation: 10-20 days from symptom onset depending on severity of illness and underlying co-morbidities/immune status and with evidence of 24 hours with no fever without the use of fever reducing medications and demonstrating other symptoms of COVID-19 are improving. If unvaccinated please provide the following information at discharge: People with COVID-19 who have symptoms should wait to be vaccinated until they have recovered from their illness and have met the criteria for discontinuing isolation. If treated with monoclonal antibodies or convalescent antibodies patients must wait 90 days until being eligible for vaccination. Elevated troponin concerning for COVID cardiomyopathy:  - Trop 0.01, 0.02, 0.04.   - D-dimer 623. Procalc 0.09  - aPTT 34.9, Fibrinogen 345.   - Echo pending to r/o COVID-associated cardiomyopathy    Hyponatremia:  - On admit, Na 127, blood sugar 184. Corrected Na is 128. - BMP pending  - BMP q6 hrs    Diabetes mellitus:  - Blood sugar on admit 184. - On steroids for COVID treatment.   - SSI  - Hold home Metformin due to uptrending troponins    Anxiety  - Hydroxyzine 25mg TID PRN    Chronic conditions:  - HLD: held home Lovastatin 20mg  - HTN: held home Lisinopril 10 mg      DVT Prophylaxis: SQ Lovenox 30 mg BID  Diet: ADULT DIET; Regular; 3 carb choices (45 gm/meal); Low Fat/Low Chol/High Fiber/2 gm Na  Code Status: Full Code    PT/OT Eval Status: Not indicated at this time. Dispo - Pending ID recommendations on treatment. Patient clinically stable, currently satting well on RA. Rigoberto Kelley DO    Thank you Liliana Drake MD for the opportunity to be involved in this patient's care. If you have any questions or concerns please feel free to contact me at [phone].

## 2021-09-23 NOTE — ED NOTES
Pt was SPO2 80% on 5L, switched pt to high flow cannula, was unable to get pt above 85%. Switched pt to non-rebreather, pt started fighting against staff. Pt refusing intubation at this time, Dr. Eleno Mccormick at bedside ordered bipap.        Leeroy Marcelo RN  09/22/21 [id number]

## 2021-09-23 NOTE — PROGRESS NOTES
09/23/21 0318   NIV Type   Equipment Type V60   Mode Bilevel   Mask Type Full face mask   Mask Size Small   Settings/Measurements   IPAP 12 cmH20   CPAP/EPAP 6 cmH2O   Rate Ordered 4   Resp 15   FiO2  100 %   Vt Exhaled 280 mL   Minute Volume 4 Liters   Mask Leak (lpm) 31 lpm   Comfort Level Good   Using Accessory Muscles No   SpO2 99   Alarm Settings   Alarms On Y   Press Low Alarm 6 cmH2O   High Pressure Alarm 30 cmH2O   Apnea (secs) 20 secs   Resp Rate Low Alarm 6   High Respiratory Rate 45 br/min

## 2021-09-23 NOTE — ED PROVIDER NOTES
[address] [facility]  ED  EMERGENCY DEPARTMENT ENCOUNTER        Pt Name: Fei Haney  MRN: [id number]  [place] 1980  Date of evaluation: 9/22/2021  Provider: Magy Ayoub PA-C  PCP: Brandon Ruvalcaba MD  ED Attending: Abbie Evans MD      This patient was seen by the attending provider Abbie Evans MD    History provided by the patient    CHIEF COMPLAINT:     Chief Complaint   Patient presents with    Shortness of Breath     started today. pt reports SPO2 was 80% at home. 90% on arrival to ED. pt denies any sick contacts. pt reports chest heaviness    Cough       HISTORY OF PRESENT ILLNESS:      Fei Haney is a 36 y.o. female who arrives to the ED by private vehicle. Patient states she has been feeling ill just since this morning. Patient complains chiefly of coughing, congestion and shortness of breath. She has low-grade fever and generalized fatigue. Patient has not been around anyone known to be ill. She reports she borrowed a family members pulse oximeter today and states her oxygen levels were 80% at home. This is in part why she decided to come to the ED. She is established with primary care and has a past medical history of hypertension, hyperlipidemia, type 2 diabetes and interstitial cystitis. She is not vaccinated for COVID-19. No identifiable exacerbating or alleviating factors to symptoms. Nursing Notes were reviewed     REVIEW OF SYSTEMS:     Review of Systems   Constitutional: Positive for activity change, chills and fever. HENT: Positive for congestion. Eyes: Negative. Respiratory: Positive for cough and shortness of breath. Cardiovascular: Positive for chest pain. Gastrointestinal: Negative for abdominal pain, nausea and vomiting. Genitourinary: Negative. Musculoskeletal: Positive for myalgias. Negative for back pain, gait problem and neck pain. Skin: Negative for color change. Neurological: Positive for headaches.  Negative for dizziness and weakness. Psychiatric/Behavioral: The patient is nervous/anxious. All other systems reviewed and are negative. Except as noted above in the ROS, all other systems were reviewed and negative. PAST MEDICAL HISTORY:     Past Medical History:   Diagnosis Date    Diabetes mellitus ([address]     Hyperlipidemia     Hypertension     Interstitial cystitis     Interstitial cystitis          SURGICAL HISTORY:      Past Surgical History:   Procedure Laterality Date    ABDOMEN SURGERY      CHOLECYSTECTOMY      ENDOMETRIAL ABLATION      VULVA SURGERY  3/22/11    irrigation and debridement of right vulva         CURRENT MEDICATIONS:       Previous Medications    LISINOPRIL (PRINIVIL;ZESTRIL) 10 MG TABLET    Take 10 mg by mouth daily. LOVASTATIN (MEVACOR) 20 MG TABLET    Take 20 mg by mouth daily. METFORMIN (GLUCOPHAGE-XR) 500 MG XR TABLET    Take 500 mg by mouth 3 times daily.     MULTIPLE VITAMINS-MINERALS (WOMENS MULTIVITAMIN PO)    Take by mouth daily    ZINC SULFATE (ZINCATE) 220 (50 ZN) MG CAPSULE    Take 50 mg by mouth daily         ALLERGIES:    Codeine    FAMILY HISTORY:       Family History   Problem Relation Age of Onset    Birth Defects Father     High Blood Pressure Father     High Cholesterol Father     Asthma Sister     Depression Sister     Arthritis Paternal Aunt     Diabetes Paternal Aunt     Heart Disease Paternal Aunt     High Blood Pressure Paternal Aunt     Kidney Disease Paternal Aunt     Stroke Paternal Aunt     Cancer Paternal Grandmother     Diabetes Paternal Grandmother     High Blood Pressure Paternal Grandmother     High Cholesterol Paternal Grandmother     Kidney Disease Paternal Grandmother     Cancer Paternal Grandfather     Diabetes Paternal Grandfather     Heart Disease Paternal Grandfather     High Blood Pressure Paternal Grandfather     High Cholesterol Paternal Grandfather           SOCIAL HISTORY:       Social History     Socioeconomic History    Marital status: Single     Spouse name: None    Number of children: None    Years of education: None    Highest education level: None   Occupational History    None   Tobacco Use    Smoking status: Current Every Day Smoker     Packs/day: 1.00     Years: 14.00     Pack years: 14.00     Types: Cigarettes    Smokeless tobacco: Never Used   Substance and Sexual Activity    Alcohol use: Yes     Comment: 3 x week    Drug use: No    Sexual activity: None   Other Topics Concern    None   Social History Narrative    None     Social Determinants of Health     Financial Resource Strain:     Difficulty of Paying Living Expenses:    Food Insecurity:     Worried About Running Out of Food in the Last Year:     Ran Out of Food in the Last Year:    Transportation Needs:     Lack of Transportation (Medical):  Lack of Transportation (Non-Medical):    Physical Activity:     Days of Exercise per Week:     Minutes of Exercise per Session:    Stress:     Feeling of Stress :    Social Connections:     Frequency of Communication with Friends and Family:     Frequency of Social Gatherings with Friends and Family:     Attends Church Services:     Active Member of Clubs or Organizations:     Attends Club or Organization Meetings:     Marital Status:    Intimate Partner Violence:     Fear of Current or Ex-Partner:     Emotionally Abused:     Physically Abused:     Sexually Abused:        SCREENINGS:    Fabio Coma Scale  Eye Opening: Spontaneous  Best Verbal Response: Oriented  Best Motor Response: Obeys commands  Fabio Coma Scale Score: 15        PHYSICAL EXAM:       ED Triage Vitals [09/22/21 2149]   BP Temp Temp Source Pulse Resp SpO2 Height Weight   (!) 159/102 99.7 °F (37.6 °C) Oral 132 18 90 % 5' 2\" (1.575 m) 109 lb (49.4 kg)       Physical Exam    CONSTITUTIONAL: Awake and alert. Cooperative. Well-developed. Well-nourished. Non-toxic. No acute distress. HENT: Normocephalic. Atraumatic. External ears normal, without discharge. No nasal discharge. Oropharynx clear. Mucous membranes moist.  EYES: Conjunctiva non-injected. No scleral icterus. PERRL. EOM's grossly intact. NECK: Supple. Normal ROM. CARDIOVASCULAR: Tachycardia with regular rhythm. No Murmer. Intact distal pulses. PULMONARY/CHEST WALL: Increased respiratory effort with tachypnea. Lungs with decreased breath sounds to the bases upon ausculation. ABDOMEN: Normal BS. Soft. Nondistended. No tenderness to palpate. No guarding. /ANORECTAL: Not assessed  MUSKULOSKELETAL: Normal ROM. No acute deformities. No edema. No tenderness to palpate. SKIN: Warm and dry. No rash. NEUROLOGICAL: Alert and oriented x 3. GCS 15. CN II-XII grossly intact. Strength is 5/5 in all extremities and sensation is intact. Normal gait. Scottie Sinan   PSYCHIATRIC: Anxious        DIAGNOSTICRESULTS:     LABS:    Results for orders placed or performed during the hospital encounter of 09/22/21   COVID-19, Rapid    Specimen: Nasopharyngeal Swab; Throat   Result Value Ref Range    SARS-CoV-2, NAAT DETECTED (AA) Not Detected   CBC auto differential   Result Value Ref Range    WBC 9.8 4.0 - 11.0 K/uL    RBC 4.19 4.00 - 5.20 M/uL    Hemoglobin 13.2 12.0 - 16.0 g/dL    Hematocrit 38.9 36.0 - 48.0 %    MCV 93.0 80.0 - 100.0 fL    MCH 31.5 26.0 - 34.0 pg    MCHC 33.9 31.0 - 36.0 g/dL    RDW 14.1 12.4 - 15.4 %    Platelets 672 997 - 612 K/uL    MPV 8.5 5.0 - 10.5 fL    Neutrophils % 82.7 %    Lymphocytes % 10.6 %    Monocytes % 5.7 %    Eosinophils % 0.2 %    Basophils % 0.8 %    Neutrophils Absolute 8.1 (H) 1.7 - 7.7 K/uL    Lymphocytes Absolute 1.0 1.0 - 5.1 K/uL    Monocytes Absolute 0.6 0.0 - 1.3 K/uL    Eosinophils Absolute 0.0 0.0 - 0.6 K/uL    Basophils Absolute 0.1 0.0 - 0.2 K/uL   Comprehensive Metabolic Panel w/ Reflex to MG   Result Value Ref Range    Sodium 127 (L) 136 - 145 mmol/L    Potassium reflex Magnesium 4.5 3.5 - 5.1 mmol/L    Chloride 91 (L) 99 - 110 mmol/L CO2 22 21 - 32 mmol/L    Anion Gap 14 3 - 16    Glucose 184 (H) 70 - 99 mg/dL    BUN 10 7 - 20 mg/dL    CREATININE 0.8 0.6 - 1.1 mg/dL    GFR Non-African American >60 >60    GFR African American >60 >60    Calcium 9.2 8.3 - 10.6 mg/dL    Total Protein 7.5 6.4 - 8.2 g/dL    Albumin 4.3 3.4 - 5.0 g/dL    Albumin/Globulin Ratio 1.3 1.1 - 2.2    Total Bilirubin <0.2 0.0 - 1.0 mg/dL    Alkaline Phosphatase 57 40 - 129 U/L    ALT 48 (H) 10 - 40 U/L    AST 76 (H) 15 - 37 U/L    Globulin 3.2 g/dL   Blood gas, venous   Result Value Ref Range    pH, Roberth 7.285 (L) 7.350 - 7.450    pCO2, Roberth 52.0 (H) 40.0 - 50.0 mmHg    pO2, Roberth 30.4 25 - 40 mmHg    HCO3, Venous 24.2 23.0 - 29.0 mmol/L    Base Excess, Roberth -3.1 (L) -3.0 - 3.0 mmol/L    O2 Sat, Roberth 51 Not Established %    Carboxyhemoglobin 1.7 (H) 0.0 - 1.5 %    MetHgb, Roberth 0.4 <1.5 %    TC02 (Calc), Roberth 26 Not Established mmol/L    O2 Therapy Unknown    Troponin   Result Value Ref Range    Troponin 0.01 <0.01 ng/mL   Brain Natriuretic Peptide   Result Value Ref Range    Pro-BNP 4,602 (H) 0 - 124 pg/mL   Procalcitonin   Result Value Ref Range    Procalcitonin 0.09 0.00 - 0.15 ng/mL   EKG 12 Lead   Result Value Ref Range    Ventricular Rate 131 BPM    Atrial Rate 131 BPM    P-R Interval 124 ms    QRS Duration 70 ms    Q-T Interval 300 ms    QTc Calculation (Bazett) 443 ms    P Axis 77 degrees    R Axis 72 degrees    T Axis -74 degrees    Diagnosis       Sinus tachycardiaBiatrial enlargementST & T wave abnormality, consider inferolateral ischemiaAbnormal ECGWhen compared with ECG of 02-NOV-2011 21:48,ST now depressed in Inferior leadsST now depressed in Lateral leadsT wave inversion now evident in Inferior leadsT wave inversion now evident in Lateral leads         RADIOLOGY:  All x-ray studies areviewed/reviewed by me.   Formal interpretations per the radiologist are as follows:      XR CHEST PORTABLE    Result Date: 9/22/2021  EXAMINATION: ONE XRAY VIEW OF THE CHEST 9/22/2021 10:02 pm COMPARISON: 11/02/2011 HISTORY: ORDERING SYSTEM PROVIDED HISTORY: shortness of breath TECHNOLOGIST PROVIDED HISTORY: Reason for exam:->shortness of breath Reason for Exam: sob Acuity: Acute Type of Exam: Initial FINDINGS: The heart is mildly enlarged more prominent. The pulmonary vessels are engorged and ill-defined centrally. There hazy perihilar and bibasilar opacities which have increased. There is mild blunting of the costophrenic sulci. Mild cardiomegaly and mild pulmonary edema. Hazy perihilar and bibasilar opacities which is probably related to the pulmonary edema and/or associated infiltrates from pneumonia. Questionable small bibasilar pleural effusions. EKG: The Ekg interpreted by me in the absence of a cardiologist shows. sinus tachycardia, [id number]     See also interpretation by Georgina Martinez MD.      PROCEDURES:   N/A    CRITICAL CARE TIME:       Due to the immediate potential for life-threatening deterioration due to acute respiratory failure with hypoxia, I spent 35 minutes providing critical care. This time is excluding time spent performing procedures.       CONSULTS:  IP CONSULT TO HOSPITALIST      EMERGENCY DEPARTMENT COURSE and DIFFERENTIAL DIAGNOSIS/MDM:   Vitals:    Vitals:    09/22/21 2229 09/22/21 2335 09/22/21 2356 09/23/21 0004   BP: (!) 180/115 (!) 177/126 (!) 180/114    Pulse: 129 145 144    Resp: 17 26 (!) 36 (!) 38   Temp: 99.7 °F (37.6 °C) 97.5 °F (36.4 °C) 97.5 °F (36.4 °C)    TempSrc: Oral Axillary Axillary    SpO2: 95% 94% 94%    Weight:       Height:           Patient was given the following medications:  Medications   nitroGLYCERIN (NITROSTAT) SL tablet 0.4 mg (0.4 mg SubLINGual Not Given 9/23/21 0003)   sodium chloride flush 0.9 % injection 10 mL (has no administration in time range)   sodium chloride flush 0.9 % injection 10 mL (has no administration in time range)   0.9 % sodium chloride infusion (has no administration in time range)   potassium chloride 10 mEq/100 mL IVPB (Peripheral Line) (has no administration in time range)   magnesium sulfate 2000 mg in 50 mL IVPB premix (has no administration in time range)   promethazine (PHENERGAN) tablet 12.5 mg (has no administration in time range)     Or   ondansetron (ZOFRAN) injection 4 mg (has no administration in time range)   enoxaparin (LOVENOX) injection 30 mg (has no administration in time range)   guaiFENesin-dextromethorphan (ROBITUSSIN DM) 100-10 MG/5ML syrup 5 mL (has no administration in time range)   dexamethasone (DECADRON) injection 6 mg (has no administration in time range)   Vitamin D (CHOLECALCIFEROL) tablet 2,000 Units (has no administration in time range)   benzonatate (TESSALON) capsule 200 mg (200 mg Oral Not Given 9/23/21 0000)   glucose (GLUTOSE) 40 % oral gel 15 g (has no administration in time range)   dextrose 50 % IV solution (has no administration in time range)   glucagon (rDNA) injection 1 mg (has no administration in time range)   dextrose 5 % solution (has no administration in time range)   insulin lispro (HUMALOG) injection vial 0-6 Units (has no administration in time range)   insulin lispro (HUMALOG) injection vial 0-3 Units (has no administration in time range)   hydrOXYzine (VISTARIL) injection 50 mg (has no administration in time range)   furosemide (LASIX) injection 40 mg (has no administration in time range)   acetaminophen (TYLENOL) tablet 1,000 mg (1,000 mg Oral Given 9/22/21 2225)   methylPREDNISolone sodium (SOLU-MEDROL) injection 60 mg (60 mg IntraVENous Given 9/22/21 2226)   furosemide (LASIX) injection 40 mg (40 mg IntraVENous Given 9/22/21 2333)   morphine 2 MG/ML injection (2 mg  Given 9/22/21 2351)         Patient was evaluated by both myself and Tino Brice MD.   Old records were reviewed. Patient arrives to the emergency department with just 1 day of feeling ill, she complains chiefly of shortness of breath, cough and some chest discomfort.  Patient was evaluated by me shortly after her arrival and she is observed to be sitting in the bed, mildly tachypneic and with oxygen saturations in the upper 80s on room air. I placed her on 2 L nasal cannula oxygen. Through her time in the ED she is continued to deteriorate and require more oxygen supplementation. First nasal cannula oxygen, she was briefly placed on nonrebreather. She was tried on Vapotherm. Ultimately, patient worsened to the point of requiring BiPAP. Rapid COVID-19 test positive  CBC white count 9.8 with H&H 13.2 and 38.9  CMP hyponatremia 127, chloride low at 91, glucose elevated 184. Renal function good. Mild transaminitis with ALT 48, AST 76  VBG pH 7.285, PCO2 52  Troponin 0.01  proBNP 4602  Procalcitonin 0.09  Portable chest x-ray shows:   Mild cardiomegaly and mild pulmonary edema. Hazy perihilar and bibasilar opacities which is probably related to the   pulmonary edema and/or associated infiltrates from pneumonia. Questionable small bibasilar pleural effusions. Her EKG showed sinus tachycardia with rate 131 bpm    Patient was ordered 1 g Tylenol orally and Solu-Medrol 60 mg IV on arrival. Patient was ordered some fluids initially, however these are ultimately slowed on being her chest x-ray and observing her respiratory deterioration. Patient is going to warrant at the least, stepdown if not ICU. She is experiencing acute respiratory failure with concerning signs of heart failure. I am ordering a CT chest PE study but ultimately will have the patient admitted for further management of respiratory failure, COVID-19 infection and potentially underlying cardiac complication as result as well. I spoke with Dr. Ammon Feliz. We thoroughly discussed the history, physical exam, laboratory and imaging studies, as well as, emergency department course.  Based upon that discussion, we've decided to admit Lea Graves for further observation and evaluation of Lea Graves's acute respiratory failure with hypoxia. As I have deemed necessary from their history, physical, and studies, I have considered and evaluated Roverto Graves for the following diagnoses:  ACUTE CORONARY SYNDROME, PERICARDIAL TAMPONADE, CHF, SEPSIS, COPD EXACERBATION, PNEUMONIA, PNEUMOTHORAX, PULMONARY EMBOLISM, and THORACIC DISSECTION. FINAL IMPRESSION:      1. Acute respiratory failure with hypoxia (HCC)    2. COVID-19 virus infection    3. Acute pulmonary edema (HCC)    4. Hyponatremia    5.  Transaminitis          DISPOSITION/PLAN:   DISPOSITION Admitted                   (Please note thatportions of this note were completed with a voice recognition program.  Efforts were made to edit the dictations, but occasionally words are mis-transcribed.)    Jasmina Schaeffer PA-C (electronicallysigned)             KRISHNA Moore  09/23/21 0043

## 2021-09-23 NOTE — CARE COORDINATION
CASE MANAGEMENT INITIAL ASSESSMENT      Reviewed chart and completed assessment via telephone with:  Explained Case Management role/services. To patient    Primary contact information:see below    Health Care Decision Maker :   Primary Decision Maker: [facility] - Brother/Sister - [phone]          Can this person be reached and be able to respond quickly, such as within a few minutes or hours? Yes    Admit date/status:inpatient 9/22/2021  Diagnosis:Inpatient covid, hyponatremia  Is this a Readmission?:  No      Insurance:BC   Precert required for SNF: Yes       3 night stay required: No waived due to pandemic    Living arrangements, Adls, care needs, prior to admission:Lives home alone and was IPTA with adl's , still drives    Transportation:TBD    Durable Medical Equipment at home:  Walker__Cane__RTS__ BSC__Shower Chair__  02__ HHN__ CPAP__  BiPap__  Hospital Bed__ W/C___ Other__________    Services in the home and/or outpatient, prior to admission:None    PT/OT recs:Ambulatory    Sharif Larsen 47 Notification (HEN):N/A    Barriers to discharge:None identified at present    Plan/comments: To return home when medically stable. Currently on 6 liters of oxygen. May have home oxygen needs. Will follow.      ECOC on chart for MD signature

## 2021-09-23 NOTE — ED NOTES
PT continues to ask for \"something for anxiety\". Messaged pharmacy again.        Nohemy Aiken RN  09/23/21 0106

## 2021-09-23 NOTE — PROGRESS NOTES
09/23/21 0004   NIV Type   $NIV $Daily Charge   Skin Assessment Clean, dry, & intact   Skin Protection for O2 Device Yes   Location Nose   NIV Started/Stopped On   Equipment Type V60   Mode Bilevel   Mask Type Full face mask   Mask Size Small   Settings/Measurements   IPAP 12 cmH20   CPAP/EPAP 6 cmH2O   Rate Ordered 4   Resp (!) 38   FiO2  100 %   Vt Exhaled 390 mL   Minute Volume 14.7 Liters   Mask Leak (lpm) 0 lpm   Comfort Level Good   Using Accessory Muscles Yes   SpO2 95   Alarm Settings   Alarms On Y   Press Low Alarm 6 cmH2O   High Pressure Alarm 30 cmH2O   Delay Alarm 20 sec(s)   Resp Rate Low Alarm 6   High Respiratory Rate 45 br/min

## 2021-09-23 NOTE — CONSULTS
Infectious Diseases   Consult Note      Reason for Consult:  COVID, evaluate candidacy for mab    Requesting Physician:  Rika Perez DO       Date of Admission: 9/22/2021  Subjective:   CHIEF COMPLAINT:   None given       HPI:    Enedina Sauer is a 41yoF with history of  HTN, HLD, well controlled DM with A1c 6, interstitial cystitis, s/p CCY    ED 9/22/21 - 1d history malaise, SOB, cough. Hypoxemic on arrival.      CXR with pulmonary edema, CM, \"hazy perihilar and bibasilar opacities. \"  CT no PE, multifocal consolidation, vanessa pleural effusions  She was hypertensive and tachycardic. Required Bipap in ED  COVID NAAT+             Trop initially 0.01 but serially rising slightly. BNP was 4600   She was given doses of dex, methylpred, RDV    She has been weaned from Bipap to AF   She says she is feeling better. Cough, chest pain denied. Hemoptysis denied. Current abx:  None    Dexamethasone 6mg tab daily   Enoxaparin 30 BID       Past Surgical History:       Diagnosis Date    COVID-19 09/22/2021    Diabetes mellitus ([address]     Hyperlipidemia     Hypertension     Interstitial cystitis     Interstitial cystitis          Procedure Laterality Date    ABDOMEN SURGERY      CHOLECYSTECTOMY      ENDOMETRIAL ABLATION      VULVA SURGERY  3/22/11    irrigation and debridement of right vulva       Social History:    TOBACCO:   reports that she has been smoking cigarettes. She has a 14.00 pack-year smoking history. She has never used smokeless tobacco.  ETOH:   reports current alcohol use. There is no history of illicit drug use or other significant epidemiologic exposures.       Family History:       Problem Relation Age of Onset    Birth Defects Father     High Blood Pressure Father     High Cholesterol Father     Asthma Sister     Depression Sister     Arthritis Paternal Aunt     Diabetes Paternal Aunt     Heart Disease Paternal Aunt     High Blood Pressure Paternal Aunt     Kidney Disease Paternal Aunt     Stroke Paternal Aunt     Cancer Paternal Grandmother     Diabetes Paternal Grandmother     High Blood Pressure Paternal Grandmother     High Cholesterol Paternal Grandmother     Kidney Disease Paternal Grandmother     Cancer Paternal Grandfather     Diabetes Paternal Grandfather     Heart Disease Paternal Grandfather     High Blood Pressure Paternal Grandfather     High Cholesterol Paternal Grandfather        Current Medications:    Current Facility-Administered Medications: hydrOXYzine (ATARAX) tablet 25 mg, 25 mg, Oral, TID PRN  [START ON 9/24/2021] dexamethasone (DECADRON) tablet 6 mg, 6 mg, Oral, Daily  [Held by provider] metFORMIN (GLUCOPHAGE-XR) extended release tablet 500 mg, 500 mg, Oral, Daily with breakfast  sodium chloride flush 0.9 % injection 10 mL, 10 mL, IntraVENous, 2 times per day  sodium chloride flush 0.9 % injection 10 mL, 10 mL, IntraVENous, PRN  0.9 % sodium chloride infusion, 25 mL, IntraVENous, PRN  potassium chloride 10 mEq/100 mL IVPB (Peripheral Line), 10 mEq, IntraVENous, PRN  magnesium sulfate 2000 mg in 50 mL IVPB premix, 2,000 mg, IntraVENous, PRN  promethazine (PHENERGAN) tablet 12.5 mg, 12.5 mg, Oral, Q6H PRN **OR** ondansetron (ZOFRAN) injection 4 mg, 4 mg, IntraVENous, Q6H PRN  enoxaparin (LOVENOX) injection 30 mg, 30 mg, SubCUTAneous, BID  guaiFENesin-dextromethorphan (ROBITUSSIN DM) 100-10 MG/5ML syrup 5 mL, 5 mL, Oral, Q4H PRN  Vitamin D (CHOLECALCIFEROL) tablet 2,000 Units, 2,000 Units, Oral, Daily  benzonatate (TESSALON) capsule 200 mg, 200 mg, Oral, TID  glucose (GLUTOSE) 40 % oral gel 15 g, 15 g, Oral, PRN  dextrose 50 % IV solution, 12.5 g, IntraVENous, PRN  glucagon (rDNA) injection 1 mg, 1 mg, IntraMUSCular, PRN  dextrose 5 % solution, 100 mL/hr, IntraVENous, PRN  insulin lispro (HUMALOG) injection vial 0-6 Units, 0-6 Units, SubCUTAneous, TID WC  insulin lispro (HUMALOG) injection vial 0-3 Units, 0-3 Units, SubCUTAneous, Nightly      Allergies Allergen Reactions    Codeine      Increases glucose        REVIEW OF SYSTEMS:    CONSTITUTIONAL:   Per HPI   EYES:  negative for blurred vision, eye discharge, visual disturbance and icterus  HEENT:  negative for hearing loss, tinnitus, ear drainage, sinus pressure, nasal congestion, epistaxis and snoring  RESPIRATORY:   Per HPI   CARDIOVASCULAR:   Denies chest pain, palpitations, exertional chest pressure/discomfort, edema, syncope  GASTROINTESTINAL:  negative for nausea, vomiting, diarrhea, constipation, blood in stool and abdominal pain  GENITOURINARY:  negative for frequency, dysuria, urinary incontinence, decreased urine volume, and hematuria  HEMATOLOGIC/LYMPHATIC:  negative for easy bruising, bleeding and lymphadenopathy  ALLERGIC/IMMUNOLOGIC:  negative for recurrent infections, angioedema, anaphylaxis and drug reactions  ENDOCRINE:  negative for weight changes and diabetic symptoms including polyuria, polydipsia and polyphagia  MUSCULOSKELETAL:  negative for acute joint swelling, decreased range of motion and muscle weakness  NEUROLOGICAL:  negative for headaches, slurred speech, unilateral weakness  PSYCHIATRIC/BEHAVIORAL: negative for hallucinations, behavioral problems, confusion and agitation. Objective:   PHYSICAL EXAM:      VITALS:  /81   Pulse 94   Temp 98.3 °F (36.8 °C) (Oral)   Resp 16   Ht 5' 2\" (1.575 m)   Wt 109 lb (49.4 kg)   LMP 09/20/2021   SpO2 95%   BMI 19.94 kg/m²      24HR INTAKE/OUTPUT:      Intake/Output Summary (Last 24 hours) at 9/23/2021 1635  Last data filed at 9/23/2021 0830  Gross per 24 hour   Intake 120 ml   Output 1700 ml   Net -1580 ml     CONSTITUTIONAL:  Awake, alert, cooperative, no apparent distress, and appears stated age  [de-identified]: NCAT, PERRL, EOMI. Sclera white, conjunctiva full.   OP with moist mucosal membranes, no thrush, tongue protrudes midline  NECK:  Supple, symmetrical, trachea midline, no adenopathy  LUNGS:  no increased work of breathing  CARDIOVASCULAR:  RRR  ABDOMEN:  normal bowel sounds, soft, flat, NT   PSYCHIATRIC: Oriented to person place and time. No obvious depression or anxiety. MUSCULOSKELETAL: No obvious misalignment or effusion of the joints. No clubbing, cyanosis of the digits. SKIN:  normal skin color, texture, turgor and no redness, warmth, or swelling. No palpable nodules or stigmata of embolic phenomenon  NEUROLOGIC: nonfocal exam  ACCESS:  IV site ok       DATA:    Old records have been reviewed    CBC:  Recent Labs     09/22/21 2207 09/23/21  0942   WBC 9.8 8.4  8.1   RBC 4.19 3.97*  3.90*   HGB 13.2 12.4  12.2   HCT 38.9 37.2  36.8    203  199   MCV 93.0 93.6  94.3   MCH 31.5 31.3  31.4   MCHC 33.9 33.4  33.3   RDW 14.1 14.3  14.2      BMP:  Recent Labs     09/22/21 2207 09/23/21  0942 09/23/21  1133   * 128*  130* 129*   K 4.5 4.9  5.0 4.6   CL 91* 95*  96* 95*   CO2 22 24  24 23   BUN 10 15  15 16   CREATININE 0.8 0.9  0.8 0.8   CALCIUM 9.2 8.1*  8.1* 8.1*   GLUCOSE 184* 208*  211* 206*        CRP 13  Ferritin 74        Cultures:   9/22 COVID NAAT+  9/23 Legionella ag neg       Radiology Review:  All pertinent images / reports were reviewed as a part of this visit. CT chest 9/23/21  Impression   1. No evidence of acute pulmonary embolism. 2. Bilateral lung multifocal dense consolidation, greatest within the lower   lung lobes, consistent with patient reported diagnosis of COVID-19 viral   pneumonia. 3. Mild right and trace left layering posterior pleural effusions. 4. Mild cardiomegaly.        Assessment:     Patient Active Problem List   Diagnosis    Interstitial cystitis    Acute respiratory failure with hypoxia ([facility] [address]       COVID-19 and acute hypoxemic respiratory failure   Unvaccinated host   Date of symptom onset 9/21/21  Date of admission, diagnosis 9/22/21  Typical features including lymphopenia, transaminitis noted  Inflammatory markers are not elevated   Profound hypoxemia has resolved in <24 hours, she is now on room air. This is very atypical of COVID   -continue isolation  -supportive care  -would redose steroid in AM but may not have need of any further doses beyond that if condition continues to improve  -COVID mab not authorized for patients hospitalized for COVID nor COVID patients requiring supplemental oxygen. Though there is some ambiguity re cause of hypoxemic respiratory failure, would not give  -no role for JK- or IL-6 inhibitors   -would hold off on antiviral therapy since clinical status has improved     Suspected flash pulmonary edema as potential primary cause of acute hypoxemic respiratory failure, perhaps accounting for rapid resolution of hypoxemia with NIPPV and control of BP/HR  Will also have to consider viral myocarditis or stress induced CM  Echo was ordered      Discussed with patient, questions addressed  D/w RN, Samira Hidalgo M.D. Thank you for the opportunity to participate in the care of your patient.     Please do not hesitate to contact me:   [phone] office

## 2021-09-23 NOTE — ED PROVIDER NOTES
I independently performed a history and physical on Harjinder Graves. All diagnostic, treatment, and disposition decisions were made by myself in conjunction with the advanced practice provider. Briefly, this is a 36 y.o. female here for worsening shortness of breath and suspected COVID-19 infection. She was hypoxic at home. On exam, adult female normal build who is very anxious, and in acute respiratory distress. She is tachycardic and tachypneic and also hypoxic. Crackles bilaterally. No edema. EKG  The Ekg interpreted by me in the absence of a cardiologist shows:  Sinus tachycardia at a rate of 131 bpm, [place] interval QRS QTC normal.  She has ST depression in the inferior lateral leads with T wave inversion. The T wave and ST changes appear new when compared to November 2011. Screenings   [place] Coma Scale  Eye Opening: Spontaneous  Best Verbal Response: Oriented  Best Motor Response: Obeys commands  Fabio Coma Scale Score: 15        Critical Time      MDM  Adult female who comes in for worsening hypoxia. Laboratory studies chest x-ray EKG cardiac monitoring all ordered. Cardiac monitor as read and interpreted by myself shows sinus tachycardia. Chest x-ray shows findings suspicious for pulmonary edema. BNP is elevated. Lasix eventually ordered. The patient has desaturation of her respiratory status. She is subsequently placed on increased nasal cannula and then a nonrebreather and then with the hospitalist decision she was placed on BiPAP. She did refuse intubation. COVID-19 is positive. Patient Referrals:  No follow-up provider specified. Discharge Medications:  New Prescriptions    No medications on file       FINAL IMPRESSION  1. Acute respiratory failure with hypoxia (HCC)    2. COVID-19 virus infection    3. Acute pulmonary edema (HCC)    4. Hyponatremia    5.  Transaminitis        Blood pressure 122/89, pulse 123, temperature 97.5 °F (36.4 °C), temperature source Axillary, resp. rate (!) 32, height 5' 2\" (1.575 m), weight 109 lb (49.4 kg), last menstrual period 09/20/2021, SpO2 94 %. For further details of [facility] emergency department encounter, please see documentation by advanced practice provider.       Stephanie Villatoro MD  09/23/21 0112

## 2021-09-23 NOTE — ED NOTES
Pt called out that she was going to vomit. Writer donned PPE and came into room to help pt, pt stating she cannot breathe. Pt on 2L, at 97% but also saying she \"feels bad\"  Pt coughing so hard she was having difficulty catching breath. Pt also asking for something for anxiety. Pt verbalized feeling like she's having a panic attack.   Pt work of breathing strenuous     Wilfrido Edmondson, [address] [facility]  09/22/21 [id number]

## 2021-09-23 NOTE — ACP (ADVANCE CARE PLANNING)
Advance Care Planning     Advance Care Planning Activator (Inpatient)  Conversation Note      Date of ACP Conversation: 9/23/2021     Conversation Conducted with: Patient    ACP Activator: Rika Lutz RN        Health Care Decision Maker:     Current Designated Health Care Decision Maker:     Primary Decision Maker: [facility] - Brother/Sister - [phone]      Care Preferences    Ventilation: \"If you were in your present state of health and suddenly became very ill and were unable to breathe on your own, what would your preference be about the use of a ventilator (breathing machine) if it were available to you? \"      Would the patient desire the use of ventilator (breathing machine)?: yes    \"If your health worsens and it becomes clear that your chance of recovery is unlikely, what would your preference be about the use of a ventilator (breathing machine) if it were available to you? \"     Would the patient desire the use of ventilator (breathing machine)?: No      Resuscitation  \"CPR works best to restart the heart when there is a sudden event, like a heart attack, in someone who is otherwise healthy. Unfortunately, CPR does not typically restart the heart for people who have serious health conditions or who are very sick. \"    \"In the event your heart stopped as a result of an underlying serious health condition, would you want attempts to be made to restart your heart (answer \"yes\" for attempt to resuscitate) or would you prefer a natural death (answer \"no\" for do not attempt to resuscitate)? \" yes

## 2021-09-23 NOTE — PROGRESS NOTES
Rakesh Jauregui DO   Patient: Saima Copping   0'\"   9/23/21 11:17 AM   [phone] Hospital or Facility: [facility] From: Gris Todd RE: Celina ricketts 1980 RM: [id number] home o2 eval on room air Need Callback: NO CALLBACK REQ C4 PROGRESSIVE CARE  Unread

## 2021-09-24 VITALS
TEMPERATURE: 101.3 F | WEIGHT: 110.67 LBS | HEIGHT: 62 IN | OXYGEN SATURATION: 95 % | DIASTOLIC BLOOD PRESSURE: 98 MMHG | RESPIRATION RATE: 16 BRPM | SYSTOLIC BLOOD PRESSURE: 145 MMHG | BODY MASS INDEX: 20.37 KG/M2 | HEART RATE: 114 BPM

## 2021-09-24 PROBLEM — U07.1 COVID-19: Status: ACTIVE | Noted: 2021-09-24

## 2021-09-24 LAB
AMPHETAMINE SCREEN, URINE: NORMAL
ANION GAP SERPL CALCULATED.3IONS-SCNC: 11 MMOL/L (ref 3–16)
ANION GAP SERPL CALCULATED.3IONS-SCNC: 11 MMOL/L (ref 3–16)
BARBITURATE SCREEN URINE: NORMAL
BENZODIAZEPINE SCREEN, URINE: NORMAL
BUN BLDV-MCNC: 18 MG/DL (ref 7–20)
BUN BLDV-MCNC: 19 MG/DL (ref 7–20)
C-REACTIVE PROTEIN: 13.5 MG/L (ref 0–5.1)
CALCIUM SERPL-MCNC: 8.4 MG/DL (ref 8.3–10.6)
CALCIUM SERPL-MCNC: 8.6 MG/DL (ref 8.3–10.6)
CANNABINOID SCREEN URINE: NORMAL
CHLORIDE BLD-SCNC: 93 MMOL/L (ref 99–110)
CHLORIDE BLD-SCNC: 96 MMOL/L (ref 99–110)
CO2: 24 MMOL/L (ref 21–32)
CO2: 25 MMOL/L (ref 21–32)
COCAINE METABOLITE SCREEN URINE: NORMAL
CREAT SERPL-MCNC: 0.8 MG/DL (ref 0.6–1.1)
CREAT SERPL-MCNC: 0.9 MG/DL (ref 0.6–1.1)
D DIMER: 361 NG/ML DDU (ref 0–229)
GFR AFRICAN AMERICAN: >60
GFR AFRICAN AMERICAN: >60
GFR NON-AFRICAN AMERICAN: >60
GFR NON-AFRICAN AMERICAN: >60
GLUCOSE BLD-MCNC: 114 MG/DL (ref 70–99)
GLUCOSE BLD-MCNC: 120 MG/DL (ref 70–99)
GLUCOSE BLD-MCNC: 125 MG/DL (ref 70–99)
GLUCOSE BLD-MCNC: 146 MG/DL (ref 70–99)
GLUCOSE BLD-MCNC: 208 MG/DL (ref 70–99)
IRON SATURATION: 5 % (ref 15–50)
IRON: 13 UG/DL (ref 37–145)
LV EF: 43 %
LVEF MODALITY: NORMAL
Lab: NORMAL
METHADONE SCREEN, URINE: NORMAL
OPIATE SCREEN URINE: NORMAL
OXYCODONE URINE: NORMAL
PERFORMED ON: ABNORMAL
PH UA: 5
PHENCYCLIDINE SCREEN URINE: NORMAL
POTASSIUM SERPL-SCNC: 4.1 MMOL/L (ref 3.5–5.1)
POTASSIUM SERPL-SCNC: 4.3 MMOL/L (ref 3.5–5.1)
PROPOXYPHENE SCREEN: NORMAL
SEDIMENTATION RATE, ERYTHROCYTE: 47 MM/HR (ref 0–20)
SODIUM BLD-SCNC: 129 MMOL/L (ref 136–145)
SODIUM BLD-SCNC: 131 MMOL/L (ref 136–145)
TOTAL IRON BINDING CAPACITY: 276 UG/DL (ref 260–445)
TROPONIN: 0.03 NG/ML
TSH REFLEX FT4: 0.93 UIU/ML (ref 0.27–4.2)
VITAMIN D 25-HYDROXY: 40.1 NG/ML

## 2021-09-24 PROCEDURE — 86038 ANTINUCLEAR ANTIBODIES: CPT

## 2021-09-24 PROCEDURE — 93306 TTE W/DOPPLER COMPLETE: CPT

## 2021-09-24 PROCEDURE — 80307 DRUG TEST PRSMV CHEM ANLYZR: CPT

## 2021-09-24 PROCEDURE — 83540 ASSAY OF IRON: CPT

## 2021-09-24 PROCEDURE — 6370000000 HC RX 637 (ALT 250 FOR IP): Performed by: INTERNAL MEDICINE

## 2021-09-24 PROCEDURE — 84443 ASSAY THYROID STIM HORMONE: CPT

## 2021-09-24 PROCEDURE — 83655 ASSAY OF LEAD: CPT

## 2021-09-24 PROCEDURE — 86140 C-REACTIVE PROTEIN: CPT

## 2021-09-24 PROCEDURE — 2580000003 HC RX 258: Performed by: INTERNAL MEDICINE

## 2021-09-24 PROCEDURE — 80048 BASIC METABOLIC PNL TOTAL CA: CPT

## 2021-09-24 PROCEDURE — 83835 ASSAY OF METANEPHRINES: CPT

## 2021-09-24 PROCEDURE — 83550 IRON BINDING TEST: CPT

## 2021-09-24 PROCEDURE — 6360000002 HC RX W HCPCS: Performed by: INTERNAL MEDICINE

## 2021-09-24 PROCEDURE — 85379 FIBRIN DEGRADATION QUANT: CPT

## 2021-09-24 PROCEDURE — 6370000000 HC RX 637 (ALT 250 FOR IP): Performed by: NURSE PRACTITIONER

## 2021-09-24 PROCEDURE — 83825 ASSAY OF MERCURY: CPT

## 2021-09-24 PROCEDURE — 6370000000 HC RX 637 (ALT 250 FOR IP)

## 2021-09-24 PROCEDURE — 82175 ASSAY OF ARSENIC: CPT

## 2021-09-24 PROCEDURE — 85652 RBC SED RATE AUTOMATED: CPT

## 2021-09-24 PROCEDURE — 84484 ASSAY OF TROPONIN QUANT: CPT

## 2021-09-24 PROCEDURE — 36415 COLL VENOUS BLD VENIPUNCTURE: CPT

## 2021-09-24 PROCEDURE — 99255 IP/OBS CONSLTJ NEW/EST HI 80: CPT | Performed by: INTERNAL MEDICINE

## 2021-09-24 RX ORDER — CARVEDILOL 3.12 MG/1
3.12 TABLET ORAL 2 TIMES DAILY WITH MEALS
Status: DISCONTINUED | OUTPATIENT
Start: 2021-09-24 | End: 2021-09-24 | Stop reason: HOSPADM

## 2021-09-24 RX ORDER — TOLVAPTAN 15 MG/1
15 TABLET ORAL DAILY
Status: COMPLETED | OUTPATIENT
Start: 2021-09-24 | End: 2021-09-24

## 2021-09-24 RX ORDER — CARVEDILOL 3.12 MG/1
3.12 TABLET ORAL 2 TIMES DAILY WITH MEALS
Qty: 60 TABLET | Refills: 1 | Status: ON HOLD
Start: 2021-09-24 | End: 2022-04-28 | Stop reason: SINTOL

## 2021-09-24 RX ORDER — LISINOPRIL 5 MG/1
5 TABLET ORAL DAILY
Status: DISCONTINUED | OUTPATIENT
Start: 2021-09-24 | End: 2021-09-24 | Stop reason: HOSPADM

## 2021-09-24 RX ADMIN — BENZONATATE 200 MG: 100 CAPSULE ORAL at 08:15

## 2021-09-24 RX ADMIN — ACETAMINOPHEN 650 MG: 325 TABLET ORAL at 06:09

## 2021-09-24 RX ADMIN — Medication 10 ML: at 08:16

## 2021-09-24 RX ADMIN — DIPHENHYDRAMINE HCL 50 MG: 25 TABLET ORAL at 03:43

## 2021-09-24 RX ADMIN — Medication 2000 UNITS: at 08:15

## 2021-09-24 RX ADMIN — Medication 15 MG: at 14:40

## 2021-09-24 RX ADMIN — ENOXAPARIN SODIUM 30 MG: 30 INJECTION SUBCUTANEOUS at 08:15

## 2021-09-24 RX ADMIN — ACETAMINOPHEN 650 MG: 325 TABLET ORAL at 11:30

## 2021-09-24 RX ADMIN — BENZONATATE 200 MG: 100 CAPSULE ORAL at 13:03

## 2021-09-24 RX ADMIN — ACETAMINOPHEN 650 MG: 325 TABLET ORAL at 16:29

## 2021-09-24 RX ADMIN — HYDROXYZINE HYDROCHLORIDE 25 MG: 10 TABLET ORAL at 11:30

## 2021-09-24 RX ADMIN — INSULIN LISPRO 1 UNITS: 100 INJECTION, SOLUTION INTRAVENOUS; SUBCUTANEOUS at 13:03

## 2021-09-24 RX ADMIN — CARVEDILOL 3.12 MG: 3.12 TABLET, FILM COATED ORAL at 16:29

## 2021-09-24 RX ADMIN — LISINOPRIL 5 MG: 5 TABLET ORAL at 14:40

## 2021-09-24 ASSESSMENT — PAIN SCALES - GENERAL
PAINLEVEL_OUTOF10: 0
PAINLEVEL_OUTOF10: 4
PAINLEVEL_OUTOF10: 0
PAINLEVEL_OUTOF10: 1
PAINLEVEL_OUTOF10: 0

## 2021-09-24 NOTE — PLAN OF CARE
Problem: Airway Clearance - Ineffective  Goal: Achieve or maintain patent airway  Outcome: Met This Shift     Problem: Gas Exchange - Impaired  Goal: Absence of hypoxia  Outcome: Met This Shift  Goal: Promote optimal lung function  Outcome: Met This Shift     Problem: Breathing Pattern - Ineffective  Goal: Ability to achieve and maintain a regular respiratory rate  Outcome: Met This Shift     Problem: Isolation Precautions - Risk of Spread of Infection  Goal: Prevent transmission of infection  Outcome: Ongoing     Problem: Nutrition Deficits  Goal: Optimize nutritional status  Outcome: Ongoing     Problem: Fatigue  Goal: Verbalize increase energy and improved vitality  Outcome: Ongoing

## 2021-09-24 NOTE — DISCHARGE SUMMARY
Discharge Summary    Patient ID: Nagi Graves      Patient's PCP: Isael Swan MD    Admit Date: 9/22/2021     Discharge Date:   9/24/21    Admitting Physician: Reji Chandler DO     Discharge Physician: Rosy Easley DO     Discharge Diagnoses: Severe COVID-19 pneumonia, acute hypoxic respiratory failure, acute systolic CHF (EF 89%), Hyponatremia       Active Hospital Problems    Diagnosis     COVID-19 [U07.1]     Elevated troponin [R77.8]     Acute systolic congestive heart failure (HCC) [I50.21]     Hyponatremia [E87.1]     QT prolongation [R94.31]     Acute respiratory failure with hypoxia ([address] [J96.01]        The patient was seen and examined on day of discharge and this discharge summary is in conjunction with any daily progress note from day of discharge. Hospital Course: 36 y.o. female with Hx HTN, DM, HLD, interstitial cystitis who presented to [facility] with viral flu-like symptoms since Monday, found to have severe COVID-19 pneumonia and acute hypoxic respiratory failure requiring supplemental oxygen to maintain O2 >90%. Never vaccinated against COVID. Had remarkable improvement of O2 needs <24 hrs, now on RA. Etiology is not consistent with COVID pneumonia and may correlate with flash pulm edema. Problem based hospital course:    COVID-19 pneumonia d/t inability to maintain sats >90% on RA:  Sxs onset: 9/20/21  Vaccination status: unvaccinated  Date of positive test: 9/22/21  Goal O2 sat >90%  - Dexamethasone PO 6mg started 9/22/21. Discontinued at on 9/23/21.   - Pt required BiPAP on admission date, remarkable improvement in <24 hrs. Weaned down to RA, satting well. - Per ID consult, patient not a candidate for Remdesivir due to remarkable improvement of O2 supplementation needs <24 hrs, now on RA.  ID did not recommend monoclonal antibodies as it is not authorized for patients hospitalized for COVID or COVID patients requiring supplemental oxygen and the etiology of her O2 requirement was presumed to be cardiac in nature. New onset systolic cardiomyopathy with EF 40%:  - Trop 0.01, 0.02, 0.04, 0.03, 0.03  - D-dimer 623, 361. Procalc 0.09  - aPTT 34.9, Fibrinogen 345.  - EKG showed NSR with biatrial enlargement  - Echo showed new onset systolic cardiomyopathy with EF 40% (last EF in 3/2020 was 60-65%)  - Cardiology consulted, gave Tolvaptan x1 d/t hyponatremia and new onset HF. Recommended discharge home on aspirin 81 mg, Coreg 3.125 mg BID, Lisinopril 10 mg with close follow-up (1-2 wks after discharge). This was discussed with patient prior to discharge.     Hyponatremia:  - On admit, Na 127. Requiring IVF gtt. - Patient has history of alcohol abuse. Hyponatremia likely multifactorial with history of alcohol abuse, current COVID-19 pneumonia, and acute systolic HF (EF 87%). - Discharge Na 129. - Per Care Everywhere, baseline may be close to Na 130s.     Diabetes mellitus:  - Blood sugar on admit 184. - On steroids for COVID treatment. Was on SSI while inpatient.  - Transitioned to home Metformin 500 mg TID. Physical Exam Performed:     BP (!) 145/98   Pulse 114   Temp 101.3 °F (38.5 °C) (Oral)   Resp 16   Ht 5' 2\" (1.575 m)   Wt 110 lb 10.7 oz (50.2 kg)   LMP 09/20/2021   SpO2 95%   BMI 20.24 kg/m²       General appearance:  No apparent distress, appears stated age and cooperative. HEENT:  Normal cephalic, atraumatic without obvious deformity. Pupils equal, round, and reactive to light.  Extra ocular muscles intact. Conjunctivae/corneas clear. Neck: Supple, with full range of motion. No jugular venous distention. Trachea midline. Respiratory:  Decreased breath sounds in all lung fields. Normal respiratory effort. Now on RA. Clear to auscultation, bilaterally without Rales/Wheezes/Rhonchi. Cardiovascular:  Regular rate and rhythm with normal S1/S2 without murmurs, rubs or gallops.   Abdomen: Soft, non-tender, non-distended with normal bowel sounds. Musculoskeletal:  No clubbing, cyanosis or edema bilaterally.  Full range of motion without deformity. Skin: Skin color, texture, turgor normal.  No rashes or lesions. Neurologic:  Neurovascularly intact without any focal sensory/motor deficits. Cranial nerves: II-XII intact, grossly non-focal.  Psychiatric:  Alert and oriented, thought content appropriate, normal insight  Peripheral Pulses: +2 palpable, equal bilaterally       Labs: For convenience and continuity at follow-up the following most recent labs are provided:      CBC:    Lab Results   Component Value Date    WBC 8.1 09/23/2021    WBC 8.4 09/23/2021    HGB 12.2 09/23/2021    HGB 12.4 09/23/2021    HCT 36.8 09/23/2021    HCT 37.2 09/23/2021     09/23/2021     09/23/2021       Renal:    Lab Results   Component Value Date     09/24/2021    K 4.1 09/24/2021    K 5.0 09/23/2021    K 4.9 09/23/2021    CL 93 09/24/2021    CO2 25 09/24/2021    BUN 18 09/24/2021    CREATININE 0.8 09/24/2021    CALCIUM 8.6 09/24/2021         Significant Diagnostic Studies    Radiology:   CT CHEST PULMONARY EMBOLISM W CONTRAST   Final Result   1. No evidence of acute pulmonary embolism. 2. Bilateral lung multifocal dense consolidation, greatest within the lower   lung lobes, consistent with patient reported diagnosis of COVID-19 viral   pneumonia. 3. Mild right and trace left layering posterior pleural effusions. 4. Mild cardiomegaly. XR CHEST PORTABLE   Final Result   Mild cardiomegaly and mild pulmonary edema. Hazy perihilar and bibasilar opacities which is probably related to the   pulmonary edema and/or associated infiltrates from pneumonia. Questionable small bibasilar pleural effusions.                 Consults:     IP CONSULT TO HOSPITALIST  IP CONSULT TO INFECTIOUS DISEASES  IP CONSULT TO CARDIOLOGY    Disposition:  home    Condition at Discharge: Stable    Discharge Instructions/Follow-up:    - f/u PCP within 1 week for post-hospital care  - f/u Cardiology within 1-2 weeks for post-hospital care and new onset HF (EF 40%). Code Status:  Full Code     Activity: activity as tolerated    Diet: regular diet      Discharge Medications:     Current Discharge Medication List           Details   carvedilol (COREG) 3.125 MG tablet Take 1 tablet by mouth 2 times daily (with meals)  Qty: 60 tablet, Refills: 1              Details   zinc sulfate (ZINCATE) 220 (50 Zn) MG capsule Take 50 mg by mouth daily      metformin (GLUCOPHAGE-XR) 500 MG XR tablet Take 500 mg by mouth 3 times daily. lisinopril (PRINIVIL;ZESTRIL) 10 MG tablet Take 10 mg by mouth daily. lovastatin (MEVACOR) 20 MG tablet Take 20 mg by mouth daily. Multiple Vitamins-Minerals (WOMENS MULTIVITAMIN PO) Take by mouth daily             Time Spent on discharge is more than 30 minutes in the examination, evaluation, counseling and review of medications and discharge plan. Signed:    Dinah Coughlin DO   9/24/2021      Thank you Nhung Cintron MD for the opportunity to be involved in this patient's care. If you have any questions or concerns please feel free to contact me at [phone].

## 2021-09-24 NOTE — CONSULTS
[address] [facility]  [phone]      Attending Physician: Chloe Hamman, MD  Reason for Consultation/Chief Complaint: SOB    Subjective   History of Present Illness:  Cade Gonsales is a 36 y.o. patient who presented to the hospital with complaints of shortness of breath. Patient is unvaccinated against Covid and she recently was down in [place] and was involved in a bus tour of the city. She states that on Wednesday she had sudden onset of significant shortness of breath while at rest did not feel she could move at all there was no chest pain or pressure with this much worse while laying down. She was associated with shakes and chills and fevers up to 102 °F .she also states that for the last several months she has felt a little more fatigue and dyspnea on exertion than expected especially when she is going up and down stairs at work. Patient works as a  and is not lifting heavy items at work. She denies any chest pain pressure or any issues associated with palpitations. Past Medical History:   has a past medical history of COVID-19, Diabetes mellitus ([address] Hyperlipidemia, Hypertension, Interstitial cystitis, and Interstitial cystitis. Surgical History:   has a past surgical history that includes Abdomen surgery; Cholecystectomy; Endometrial ablation; and Vulva surgery (3/22/11). Social History:   reports that she has been smoking cigarettes. She has a 14.00 pack-year smoking history. She has never used smokeless tobacco. She reports current alcohol use. She reports that she does not use drugs.   -States 1 pack a day for many many years  -States she likes her [employer]" and has a small bottle each weekend. Family History:  family history includes Arthritis in her paternal aunt;  Asthma in her sister; Birth Defects in her father; Cancer in her paternal grandfather and paternal grandmother; Depression in her sister; Diabetes in her paternal aunt, paternal grandfather, and paternal grandmother; Heart Disease in her paternal aunt and paternal grandfather; High Blood Pressure in her father, paternal aunt, paternal grandfather, and paternal grandmother; High Cholesterol in her father, paternal grandfather, and paternal grandmother; Kidney Disease in her paternal aunt and paternal grandmother; Stroke in her paternal aunt. Home Medications:  Were reviewed and are listed in nursing record and/or below  Prior to Admission medications    Medication Sig Start Date End Date Taking? Authorizing Provider   zinc sulfate (ZINCATE) 220 (50 Zn) MG capsule Take 50 mg by mouth daily   Yes Historical Provider, MD   metformin (GLUCOPHAGE-XR) 500 MG XR tablet Take 500 mg by mouth 3 times daily. 3/22/11  Yes Historical Provider, MD   lisinopril (PRINIVIL;ZESTRIL) 10 MG tablet Take 10 mg by mouth daily. Yes Historical Provider, MD   lovastatin (MEVACOR) 20 MG tablet Take 20 mg by mouth daily.  3/22/11  Yes Historical Provider, MD   Multiple Vitamins-Minerals (WOMENS MULTIVITAMIN PO) Take by mouth daily    Historical Provider, MD        CURRENT Medications:  hydrOXYzine (ATARAX) tablet 25 mg, TID PRN  metFORMIN (GLUCOPHAGE-XR) extended release tablet 500 mg, Daily with breakfast  acetaminophen (TYLENOL) tablet 650 mg, Q4H PRN  diphenhydrAMINE (BENADRYL) tablet 50 mg, Nightly PRN  sodium chloride flush 0.9 % injection 10 mL, 2 times per day  sodium chloride flush 0.9 % injection 10 mL, PRN  0.9 % sodium chloride infusion, PRN  potassium chloride 10 mEq/100 mL IVPB (Peripheral Line), PRN  magnesium sulfate 2000 mg in 50 mL IVPB premix, PRN  promethazine (PHENERGAN) tablet 12.5 mg, Q6H PRN   Or  ondansetron (ZOFRAN) injection 4 mg, Q6H PRN  enoxaparin (LOVENOX) injection 30 mg, BID  guaiFENesin-dextromethorphan (ROBITUSSIN DM) 100-10 MG/5ML syrup 5 mL, Q4H PRN  Vitamin D (CHOLECALCIFEROL) tablet 2,000 Units, Daily  benzonatate (TESSALON) capsule 200 mg, TID  glucose (GLUTOSE) 40 % oral gel 15 g, PRN  dextrose 50 % IV solution, PRN  glucagon (rDNA) injection 1 mg, PRN  dextrose 5 % solution, PRN  insulin lispro (HUMALOG) injection vial 0-6 Units, TID WC  insulin lispro (HUMALOG) injection vial 0-3 Units, Nightly        Allergies:  Codeine     Review of Systems:   A 14 point review of symptoms completed. Pertinent positives identified in the HPI, all other review of symptoms negative as below.       Objective   PHYSICAL EXAM:    Vitals:    09/24/21 1124   BP: (!) 153/109   Pulse: 111   Resp: 16   Temp: 98.4 °F (36.9 °C)   SpO2: 95%    Weight: 110 lb 10.7 oz (50.2 kg)         General Appearance:  Alert, cooperative, no distress, appears stated age   Head:  Normocephalic, without obvious abnormality, atraumatic   Eyes:  PERRL, conjunctiva/corneas clear   Nose: Nares normal, no drainage or sinus tenderness   Throat: Lips, mucosa, and tongue normal   Neck: Supple, symmetrical, trachea midline, no adenopathy, thyroid: not enlarged, symmetric, no tenderness/mass/nodules, no carotid bruit or JVD   Lungs:   Clear to auscultation bilaterally, respirations unlabored   Chest Wall:  No deformity or tenderness   Heart:  Regular rate and rhythm, S1, S2 normal, no murmur, rub or gallop   Abdomen:   Soft, non-tender, bowel sounds active all four quadrants,  no masses, no organomegaly   Extremities: Extremities normal, atraumatic, no cyanosis or edema   Pulses: 2+ and symmetric   Skin: Skin color, texture, turgor normal, no rashes or lesions   Pysch:  Confrontational   Neurologic: Normal gross motor and sensory exam.         Labs   CBC:   Lab Results   Component Value Date    WBC 8.1 09/23/2021    WBC 8.4 09/23/2021    RBC 3.90 09/23/2021    RBC 3.97 09/23/2021    HGB 12.2 09/23/2021    HGB 12.4 09/23/2021    HCT 36.8 09/23/2021    HCT 37.2 09/23/2021    MCV 94.3 09/23/2021    MCV 93.6 09/23/2021    RDW 14.2 09/23/2021    RDW 14.3 09/23/2021     09/23/2021     09/23/2021 CMP:  Lab Results   Component Value Date     2021    K 4.1 2021    K 5.0 2021    K 4.9 2021    CL 93 2021    CO2 25 2021    BUN 18 2021    CREATININE 0.8 2021    GFRAA >60 2021    GFRAA >60 2011    AGRATIO 1.1 2021    AGRATIO 1.1 2021    LABGLOM >60 2021    GLUCOSE 208 2021    PROT 6.4 2021    PROT 6.4 2021    PROT 7.4 2011    CALCIUM 8.6 2021    BILITOT <0.2 2021    BILITOT <0.2 2021    ALKPHOS 54 2021    ALKPHOS 55 2021    AST 98 2021    AST 99 2021    ALT 79 2021    ALT 79 2021     PT/INR:  No results found for: PTINR  HgBA1c:  Lab Results   Component Value Date    LABA1C 6.0 2021     Lab Results   Component Value Date    TROPONINI 0.03 (H) 2021         Cardiac Data     Last EK2021 sinus tach inferiolateral ST depression  2021 NSR with anterior NSST changes, Qtc 513, no gross ischemia    Echo: 2021   Patient has breast implants. -- The left ventricular systolic function is mildly reduced with an ejection   fraction of 40 - 45 %. There is global hypokinesis. Mildly reduced normal   left ventricular size with mild concentric left ventricular hypertrophy. Left ventricular diastolic filling pressure is indeterminate. -- Mild mitral and tricuspid regurgitation. -- Systolic pulmonic artery pressure (SPAP) is estimated at 51 mmHg   consistent with moderate pulmonary hypertension (Right atrial pressure of 3   mmHg). Stress Test:    Cath:    Studies:   CXR:  Mild cardiomegaly and mild pulmonary edema.       Hazy perihilar and bibasilar opacities which is probably related to the   pulmonary edema and/or associated infiltrates from pneumonia.       Questionable small bibasilar pleural effusions. CTPA    1. No evidence of acute pulmonary embolism.    2. Bilateral lung multifocal dense consolidation, greatest within the lower   lung lobes, consistent with patient reported diagnosis of COVID-19 viral   pneumonia. 3. Mild right and trace left layering posterior pleural effusions. 4. Mild cardiomegaly. Assessment and Plan      1. Elevated trop: smoldering  2. Acute systolic cardiomyopathy: New, LVEF 40%  3. Hyponatremia  4. QTc prologation  5. Elevated CRP  6. COVID-19 Infection per rapid    PLAN  1. Patient was fairly confrontational on exam and difficult when discussing plan of care possible treatment options which is concerning for her post hospital care    2. She presents with new onset cardiomyopathy which is obviously concerning for possible Covid myocarditis but given her symptom history cannot rule out that this has not been present for the last few months. LVEF was last reported normal in March 2020 on stress test from [facility]    3. Does not seem to be decompensated or massively fluid overload on exam but given hyponatremia, pulmonary hypertension and presentation I agree that there was likely an element of flash pulmonary edema, but exam is limited by Covid pneumonia as well   -Lasix as needed at this time    - will give samsca x 1   -Strict I&O, daily weight, daily renals    4. We will start aspirin 81 mg daily,   -We will start low-dose beta-blocker and ACE inhibitor (lisinopril 10mg) and titrate as possible.   -Would not 1 to be overaggressive on beta-blockade with current pneumonia state   -Patient was educated on risk of birth defects with ACE inhibitor and statin. She was on those previously prior to admission but she would not state whether or not she would be willing to take birth control she desires pregnancy, proceed with caution    5. Ultimately would like outpatient cardiac MRI to evaluate myocardium but she states she is unable to to pins in her jaw. -We will need to evaluate with serial echocardiograms    6.   Need to follow for QTC prolongation,   - avoid any QT prolonging medications -We will beta-block as possible    Patient Active Problem List   Diagnosis    Interstitial cystitis    Acute respiratory failure with hypoxia ([facility] [address]           Thank you for allowing us to participate in the care of [address] [place] [address]. Please call me with any questions [phone]. Nick Beard MD, [facility] Cardiologist  [address]  [phone] [facility]  [phone] [facility]  9/24/2021 11:31 AM    I will address the patient's cardiac risk factors and adjusted pharmacologic treatment as needed. In addition, I have reinforced the need for patient directed risk factor modification. All questions and concerns were addressed to the patient/family. Alternatives to my treatment were discussed. The note was completed using EMR. Every effort was made to ensure accuracy; however, inadvertent computerized transcription errors may be present.

## 2021-09-24 NOTE — PROGRESS NOTES
Progress Note      PCP: Morena Minaya MD    Date of Admission: 9/22/2021    Chief Complaint: Starting 9/20/21, fatigue, low-grade fever, cough, progressive SOB    Hospital Course: 36 y.o. female with Hx HTN, DM, HLD, interstitial cystitis who presented to [facility] with viral flu-like symptoms since Monday, found to have severe COVID-19 pneumonia and acute hypoxic respiratory failure requiring supplemental oxygen to maintain O2 >90%. Never vaccinated against COVID. Had remarkable improvement of O2 needs <24 hrs, now on RA. Etiology is not consistent with COVID pneumonia and may correlate with flash pulm edema. Subjective: Patient is still tired, unable to sleep well here in hospital. Also states she feels fine overall, except she cannot breathe as well as she did before hospitalization. No other complaints.       Medications:  Reviewed    Infusion Medications    sodium chloride      dextrose       Scheduled Medications    lisinopril  5 mg Oral Daily    carvedilol  3.125 mg Oral BID WC    tolvaptan  15 mg Oral Daily    metFORMIN  500 mg Oral Daily with breakfast    sodium chloride flush  10 mL IntraVENous 2 times per day    enoxaparin  30 mg SubCUTAneous BID    Vitamin D  2,000 Units Oral Daily    benzonatate  200 mg Oral TID    insulin lispro  0-6 Units SubCUTAneous TID WC    insulin lispro  0-3 Units SubCUTAneous Nightly     PRN Meds: hydrOXYzine, acetaminophen, diphenhydrAMINE, sodium chloride flush, sodium chloride, potassium chloride, magnesium sulfate, promethazine **OR** ondansetron, guaiFENesin-dextromethorphan, glucose, dextrose, glucagon (rDNA), dextrose      Intake/Output Summary (Last 24 hours) at 9/24/2021 1347  Last data filed at 9/23/2021 2020  Gross per 24 hour   Intake 240 ml   Output 700 ml   Net -460 ml       Physical Exam Performed:    BP (!) 153/109   Pulse 111   Temp 98.4 °F (36.9 °C) (Oral)   Resp 16   Ht 5' 2\" (1.575 m)   Wt 110 lb 10.7 oz (50.2 kg)   LMP 09/20/2021 SpO2 95%   BMI 20.24 kg/m²     General appearance:  No apparent distress, appears stated age and cooperative. HEENT:  Normal cephalic, atraumatic without obvious deformity. Pupils equal, round, and reactive to light. Extra ocular muscles intact. Conjunctivae/corneas clear. Neck: Supple, with full range of motion. No jugular venous distention. Trachea midline. Respiratory:  Decreased breath sounds in all lung fields. Normal respiratory effort. Now on RA. Clear to auscultation, bilaterally without Rales/Wheezes/Rhonchi. Cardiovascular:  Regular rate and rhythm with normal S1/S2 without murmurs, rubs or gallops. Abdomen: Soft, non-tender, non-distended with normal bowel sounds. Musculoskeletal:  No clubbing, cyanosis or edema bilaterally. Full range of motion without deformity. Skin: Skin color, texture, turgor normal.  No rashes or lesions. Neurologic:  Neurovascularly intact without any focal sensory/motor deficits.  Cranial nerves: II-XII intact, grossly non-focal.  Psychiatric:  Alert and oriented, thought content appropriate, normal insight  Peripheral Pulses: +2 palpable, equal bilaterally       Labs:   Recent Labs     09/22/21 2207 09/23/21  0942   WBC 9.8 8.4  8.1   HGB 13.2 12.4  12.2   HCT 38.9 37.2  36.8    203  199     Recent Labs     09/23/21 2121 09/24/21 0326 09/24/21  0938   * 131* 129*   K 4.4 4.3 4.1   CL 91* 96* 93*   CO2 23 24 25   BUN 20 19 18   CREATININE 0.9 0.9 0.8   CALCIUM 8.2* 8.4 8.6     Recent Labs     09/22/21 2207 09/23/21  0942   AST 76* 99*  98*   ALT 48* 79*  79*   BILITOT <0.2 <0.2  <0.2   ALKPHOS 57 55  54     Recent Labs     09/23/21  0941   INR 1.02     Recent Labs     09/23/21  1133 09/23/21 2121 09/24/21  0326   TROPONINI 0.04* 0.03* 0.03*       Urinalysis:      Lab Results   Component Value Date    BLOODU small 11/02/2011    SPECGRAV 1.015 11/02/2011    GLUCOSEU negative 11/02/2011       Radiology:  CT CHEST PULMONARY EMBOLISM W CONTRAST patients hospitalized for COVID or COVID patients requiring supplemental oxygen. Dexamethasone PO 6mg started 9/22/21 for total treatment length of 10 days with the end date 10/1/21 or until medically ready for discharge which ever comes first.   If pt is having increasing increasing oxygen requirements refractory to current corticosteroid therapy and CRP is >/=75 mg/L, ID or pulmonology should be consulted for possible use of Tocilizumab. Anticoagulation: SQ Lovenox 30 mg BID  Obtain baseline CBC, BMP, LFT, CRP  Trend CRP daily  Isolation: 10-20 days from symptom onset depending on severity of illness and underlying co-morbidities/immune status and with evidence of 24 hours with no fever without the use of fever reducing medications and demonstrating other symptoms of COVID-19 are improving. If unvaccinated please provide the following information at discharge: People with COVID-19 who have symptoms should wait to be vaccinated until they have recovered from their illness and have met the criteria for discontinuing isolation. If treated with monoclonal antibodies or convalescent antibodies patients must wait 90 days until being eligible for vaccination.     Elevated troponin concerning for COVID cardiomyopathy:  - Trop 0.01, 0.02, 0.04, 0.03, 0.03  - D-dimer 623, 361. Procalc 0.09  - aPTT 34.9, Fibrinogen 345.  - Repeat EKG shows NSR with biatrial enlargement  - Echo shows new onset systolic cardiomyopathy with EF 40% (last EF in 3/2020 was 60-65%)  - Pending Cardiology recommendations     Hyponatremia:  - On admit, Na 127, blood sugar 184. Corrected Na is 128. - BMP pending  - BMP q6 hrs     Diabetes mellitus:  - Blood sugar on admit 184.    - On steroids for COVID treatment.   - SSI  - Hold home Metformin due to uptrending troponins     Anxiety  - Hydroxyzine 25mg TID PRN     Chronic conditions:  - HLD: held home Lovastatin 20mg  - HTN: held home Lisinopril 10 mg    DVT Prophylaxis: SQ Lovenox 30 mg BID  Diet: ADULT DIET; Regular; 3 carb choices (45 gm/meal); Low Fat/Low Chol/High Fiber/2 gm Na  Code Status: Full Code    PT/OT Eval Status: Not indicated at this time. Dispo - Patient clinically stable, satting well on RA. ID not recommending Remdesivir or mAbs. Pending cardiology recommendations.      Dany Castellano DO

## 2021-09-24 NOTE — PROGRESS NOTES
DC instructions gone over with pt and all questions answered, reiterated the importance of f/u appt's she verbalized understanding. Her ride is on the way here now.

## 2021-09-25 LAB — ANTI-NUCLEAR ANTIBODY (ANA): NEGATIVE

## 2021-09-27 ENCOUNTER — CARE COORDINATION (OUTPATIENT)
Dept: CASE MANAGEMENT | Age: 41
End: 2021-09-27

## 2021-09-27 DIAGNOSIS — U07.1 COVID-19: Primary | ICD-10-CM

## 2021-09-27 NOTE — CARE COORDINATION
[address] Transitions Initial Follow Up Call    Call within 2 business days of discharge: Yes    Patient: Alex Graves Patient : 1980   MRN: [id number]  Reason for Admission: Covid-19  Discharge Date: 21 RARS: Readmission Risk Score: 11      Last Discharge [facility]       Complaint Diagnosis Description Type Department Provider    21 Shortness of Breath; Cough Acute respiratory failure with hypoxia ([facility] [address] . .. ED to Hosp-Admission (Discharged) (ADMITTED) Marino Rodriguez MD; Lazarus Gores. .. Spoke with: Prabha SORIANO 12.: A     Transitions of Care Initial Call    Was this an external facility discharge? No Discharge Facility: n/a    Challenges to be reviewed by the provider   Additional needs identified to be addressed with provider: No  none             Method of communication with provider : none      Advance Care Planning:   Does patient have an Advance Directive: not on file. Was this a readmission? No  Patient stated reason for admission: sob cp  Patients top risk factors for readmission: medical condition-.    Care Transition Nurse (CTN) contacted the patient by telephone to perform post hospital discharge assessment. Verified name and  with patient as identifiers. Provided introduction to self, and explanation of the CTN role. CTN reviewed discharge instructions, medical action plan and red flags with patient who verbalized understanding. Patient given an opportunity to ask questions and does not have any further questions or concerns at this time. Were discharge instructions available to patient? Yes. Reviewed appropriate site of care based on symptoms and resources available to patient including: PCP, Specialist and When to call 911. The patient agrees to contact the PCP office for questions related to their healthcare.      Medication reconciliation was performed with patient, who verbalizes understanding of administration of home medications. Advised obtaining a 90-day supply of all daily and as-needed medications. Covid Risk Education     Educated patient about risk for severe COVID-19 due to risk factors according to CDC guidelines. CTN reviewed discharge instructions, medical action plan and red flag symptoms with the patient who verbalized understanding. Discussed COVID vaccination status: Yes. Education provided on COVID-19 vaccination as appropriate. Discussed exposure protocols and quarantine with CDC Guidelines. Patient was given an opportunity to verbalize any questions and concerns and agrees to contact CTN or health care provider for questions related to their healthcare. Reviewed and educated patient on any new and changed medications related to discharge diagnosis. Was patient discharged with a pulse oximeter? No Discussed and confirmed pulse oximeter discharge instructions and when to notify provider or seek emergency care. CTN provided contact information. Plan for follow-up call in 5-7 days based on severity of symptoms and risk factors. Plan for next call: symptom management-., self management-., follow up appointment-. and medication management-. Spoke with patient who reported she is doing alright. Patient reported her breathing has been ok. Patient reported she has all her medications and taking as prescribed. CTN encouraged patient to obtain a pulse oximeter to check her oxygen saturation. Patient reported she has apt with her doctor scheduled for 10/15 and she was encouraged to quarantine until 10/4. CTN encouraged patient to increase fluid intake and practice deep breathing. CTN advised patient of use of urgent care or physicians 24 hr access line if assistance is needed after hours.             Care Transitions 24 Hour Call    Do you have any ongoing symptoms?: Yes  Patient-reported symptoms: Weakness, Cough  Do you have a copy of your discharge instructions?: Yes  Do you have all of your prescriptions and are they filled?: Yes  Have you been contacted by a [employer] [address]?: No  Have you scheduled your follow up appointment?: Yes  How are you going to get to your appointment?: Car - family or friend to transport  Were you discharged with any Home Care or Post Acute Services: No  Do you feel like you have everything you need to keep you well at home?: Yes  Care Transitions Interventions         Follow Up  No future appointments.     Amanda Glover RN

## 2021-09-28 LAB
ARSENIC BLOOD: <10 UG/L
LEAD LEVEL BLOOD: <2 UG/DL
MERCURY BLOOD: <2.5 UG/L
METANEPH/PLASMA INTERP: ABNORMAL
METANEPHRINE FREE PLASMA: <0.1 NMOL/L (ref 0–0.49)
NORMETANEPHRINE FREE PLASMA: 1.47 NMOL/L (ref 0–0.89)

## 2021-09-29 ENCOUNTER — TELEPHONE (OUTPATIENT)
Dept: CARDIOLOGY CLINIC | Age: 41
End: 2021-09-29

## 2021-09-29 NOTE — TELEPHONE ENCOUNTER
received this message from [employer]. Due to [employer] being out of office for maternity leave, NPDD is the only NP in office. She is booked until 11/11/21. Please ask NPDD if she is willing to overbook her schedule for the week of 10/11/21.      From: Jamie Hunter MD   Sent: 9/24/2021   4:57 PM EDT   To: Mary Villanueva   Subject: FW: Inpatient Notes                               Patient was discharged at her request from the hospital.  She is to follow-up for new onset cardiomyopathy in the face of Covid .  Please schedule her to see an NP in 2 weeks

## 2021-09-30 NOTE — TELEPHONE ENCOUNTER
I called pt and and she stated she wants to see her PCP and does not want to schedule with cardiology at this time.

## 2021-10-01 ENCOUNTER — TELEPHONE (OUTPATIENT)
Dept: CARDIOLOGY CLINIC | Age: 41
End: 2021-10-01

## 2021-10-01 NOTE — TELEPHONE ENCOUNTER
Called results to patient for MOHSEN and Metanephrines. She would like to speak with JJP regarding the adrenaline labs.      Will fax labs to PCP per patient request

## 2021-10-01 NOTE — TELEPHONE ENCOUNTER
----- Message from Leticia Key MD sent at 9/29/2021  4:04 PM EDT -----  Please let patient know that her labs are back and does show she has slightly elevated levels of adrenaline in her system. Sometimes this can be seen as a false positive so what I like to do is repeat this lab in 1 month's time. If it remains elevated will need to be further evaluated. Please make sure patient has plasma metanephrines to be repeated in about 1 month and she follows up with me in the office afterwards to discuss.

## 2021-10-01 NOTE — TELEPHONE ENCOUNTER
I called and spoke to patient about her abnormal metanephrines. It was abnormal but not near the level we usually see typical disease. There are a lot of issues that can cause elevated numbers in a screening lab situation.   I told her like to repeat it in 1 month and if remains elevated that she would be referred to endocrine for full evaluation

## 2021-10-06 ENCOUNTER — CARE COORDINATION (OUTPATIENT)
Dept: CASE MANAGEMENT | Age: 41
End: 2021-10-06

## 2021-10-06 NOTE — CARE COORDINATION
[facility] [address] Transitions Follow Up Call    10/6/2021    Patient: Antony Barthel Deffinger  Patient : 1980   MRN: [id number]  Reason for Admission: Covid-19    Discharge Date: 21 RARS: Readmission Risk Score: 11         Attempted to reach patient via phone for care transition. VM left stating purpose of call along with my contact information requesting a return call. Care Transitions Subsequent and Final Call    Subsequent and Final Calls  Care Transitions Interventions  Other Interventions: Follow Up  No future appointments.     Pamella ALLAN, RN, [facility]  Care Transition Nurse  [phone] mobile

## 2021-10-13 ENCOUNTER — CARE COORDINATION (OUTPATIENT)
Dept: CASE MANAGEMENT | Age: 41
End: 2021-10-13

## 2021-10-13 NOTE — CARE COORDINATION
[facility] Transitions Follow Up Call    10/13/2021    Patient: Tito Graves  Patient : 1980   MRN: [id number]  Reason for Admission: Covid-19  Discharge Date: 21 RARS: Readmission Risk Score: 11         Spoke with: [address] [place] with patient who reported she was doing better and her oxygen saturation has been 100% on room air. Patient reported she has apt with her PCP tomorrow. Patient asked CTN if there was any questions she needed to discuss with her PCP and CTN discussed with patient she should discuss precautions moving forward to prevent covid such as vaccination. Patient reported she was not going to obtain covid vaccine and CTN just encouraged patient to discuss precautions to take in the future to prevent Covid reinfection. Patient denied any further needs at this time. Care Transitions Subsequent and Final Call    Subsequent and Final Calls  Do you have any ongoing symptoms?: No  Have your medications changed?: No  Do you have any questions related to your medications?: No  Do you currently have any active services?: No  Do you have any needs or concerns that I can assist you with?: No  Identified Barriers: None  Care Transitions Interventions  Other Interventions: Follow Up  No future appointments.     Chilango Kendall RN

## 2021-10-21 ENCOUNTER — HOSPITAL ENCOUNTER (OUTPATIENT)
Dept: GENERAL RADIOLOGY | Age: 41
Discharge: HOME | End: 2021-10-21
Payer: COMMERCIAL

## 2021-10-21 ENCOUNTER — HOSPITAL ENCOUNTER (OUTPATIENT)
Age: 41
Discharge: HOME | End: 2021-10-21
Payer: COMMERCIAL

## 2021-10-21 DIAGNOSIS — U07.1 INFECTION DUE TO 2019-NCOV: ICD-10-CM

## 2021-10-21 PROCEDURE — 71046 X-RAY EXAM CHEST 2 VIEWS: CPT

## 2022-02-07 ENCOUNTER — OFFICE VISIT (OUTPATIENT)
Dept: OBGYN CLINIC | Age: 42
End: 2022-02-07
Payer: COMMERCIAL

## 2022-02-07 VITALS
SYSTOLIC BLOOD PRESSURE: 128 MMHG | HEIGHT: 62 IN | HEART RATE: 105 BPM | BODY MASS INDEX: 17.96 KG/M2 | DIASTOLIC BLOOD PRESSURE: 80 MMHG | WEIGHT: 97.6 LBS | TEMPERATURE: 97.1 F

## 2022-02-07 DIAGNOSIS — K64.9 HEMORRHOIDS, UNSPECIFIED HEMORRHOID TYPE: ICD-10-CM

## 2022-02-07 DIAGNOSIS — Z01.419 ENCNTR FOR GYN EXAM (GENERAL) (ROUTINE) W/O ABN FINDINGS: Primary | ICD-10-CM

## 2022-02-07 DIAGNOSIS — Z12.4 PAP SMEAR FOR CERVICAL CANCER SCREENING: ICD-10-CM

## 2022-02-07 DIAGNOSIS — Z20.2 POSSIBLE EXPOSURE TO STD: ICD-10-CM

## 2022-02-07 DIAGNOSIS — Z12.31 ENCOUNTER FOR SCREENING MAMMOGRAM FOR MALIGNANT NEOPLASM OF BREAST: ICD-10-CM

## 2022-02-07 DIAGNOSIS — Z72.0 TOBACCO USER: ICD-10-CM

## 2022-02-07 PROCEDURE — 99386 PREV VISIT NEW AGE 40-64: CPT | Performed by: OBSTETRICS & GYNECOLOGY

## 2022-02-07 ASSESSMENT — ENCOUNTER SYMPTOMS
COUGH: 0
NAUSEA: 0
CONSTIPATION: 0
SORE THROAT: 0
ABDOMINAL PAIN: 0
DIARRHEA: 0
VOMITING: 0
SHORTNESS OF BREATH: 0

## 2022-02-07 NOTE — PROGRESS NOTES
Annual Exam      CC:   Chief Complaint   Patient presents with    New Patient    Gynecologic Exam       HPI:  39 y.o. Castro Gaona presents for her gynecologic annual exam.    Patient seen and examined. Patient is doing well today. Reports menses are regular, occurring monthly. Typically last for 5-7 days. Reports moderate flow. Hx of painful. menses. Reports hx of laparoscopic confirmed endometriosis. Reports significant for lysis of adhesions related to such. Medical history significant for T2DM and recent COVID-19 pneumonia. Has a history of hemorrhoids, has followed with surgeon in the past.  Utilizes tobacco.  Surgical history significant for laparoscopy and breast augmentation. Follows every 2 years with US for implant surveillance. Health Maintenance:  Birth control: None currently   Pregnancy plans: None currently   Safe relationship: Single     Screening:  Last pap smear: 3 years ago   History of abnormal pap smears: In early 25s she had an abnormal pap smear with HPV, denies biopsies. Reports pap smears since that time have been negative   Mammogram: Has not had   Colonoscopy: Had one in early 25s when her grandfather passed from Colon cancer     Vaccines:  Flu vaccine: Has not had   COVID-19 vaccine: Has not had     Review of Systems:   Review of Systems   Constitutional: Negative for chills and fever. HENT: Negative for congestion and sore throat. Respiratory: Negative for cough and shortness of breath. Cardiovascular: Negative for chest pain and palpitations. Gastrointestinal: Negative for abdominal pain, constipation, diarrhea, nausea and vomiting. Hemorroids   Genitourinary: Negative for dysuria, frequency, menstrual problem, pelvic pain and vaginal discharge. Musculoskeletal: Negative. Neurological: Negative. Psychiatric/Behavioral: Negative. All other systems reviewed and are negative.   Breast: Denies skin changes, nipple discharge, lesions, dimpling, tenderness or palpable masses     Primary Care Physician: Christos Boles MD    Obstetric History  OB History    Para Term  AB Living   0 0 0 0 0 0   SAB IAB Ectopic Molar Multiple Live Births   0 0 0 0 0 0       Gynecologic History  Menstrual History:   LMP: Patient's last menstrual period was 2022 (exact date).  Age of Menarche: 12   Menstrual Period: regular   Interval Between Menses: Monthly    Duration of Menses: 5-6 days    Menstrual Flow: Moderate    Bleeding between menses: Denies - hx of with prior evaluation   Painful menses: Hx of     Sexual History:   Contraception: see above   Currently is sexually active   20 Lifetime partners   Denies history of STIs   Denies sexual problems    Pap History:   History of abnormal pap smears: see above   Last pap: see above      Medical History:  Past Medical History:   Diagnosis Date    Abnormal Pap smear of cervix     COVID-19 2021    Diabetes mellitus ([address]     Endometriosis     Hyperlipidemia     Hypertension     Interstitial cystitis     Interstitial cystitis        Medications:  Current Outpatient Medications   Medication Sig Dispense Refill    carvedilol (COREG) 3.125 MG tablet Take 1 tablet by mouth 2 times daily (with meals) 60 tablet 1    zinc sulfate (ZINCATE) 220 (50 Zn) MG capsule Take 50 mg by mouth daily      Multiple Vitamins-Minerals (WOMENS MULTIVITAMIN PO) Take by mouth daily      metformin (GLUCOPHAGE-XR) 500 MG XR tablet Take 500 mg by mouth 3 times daily.  lisinopril (PRINIVIL;ZESTRIL) 10 MG tablet Take 10 mg by mouth daily.  lovastatin (MEVACOR) 20 MG tablet Take 20 mg by mouth daily. No current facility-administered medications for this visit.        Surgical History:  Past Surgical History:   Procedure Laterality Date    ABDOMEN SURGERY      CHOLECYSTECTOMY      ENDOMETRIAL ABLATION      VULVA SURGERY  3/22/11    irrigation and debridement of right vulva Allergies: Allergies   Allergen Reactions    Codeine      Increases glucose       Family History:  Family History   Problem Relation Age of Onset    Birth Defects Father     High Blood Pressure Father     High Cholesterol Father     Asthma Sister     Depression Sister     Arthritis Paternal Aunt     Diabetes Paternal Aunt     Heart Disease Paternal Aunt     High Blood Pressure Paternal Aunt     Kidney Disease Paternal Aunt     Stroke Paternal Aunt     Cancer Paternal Grandmother     Diabetes Paternal Grandmother     High Blood Pressure Paternal Grandmother     High Cholesterol Paternal Grandmother     Kidney Disease Paternal Grandmother     Cancer Paternal Grandfather     Diabetes Paternal Grandfather     Heart Disease Paternal Grandfather     High Blood Pressure Paternal Grandfather     High Cholesterol Paternal Grandfather         Reports personal/family history of cervical, uterine, ovarian, vulvar, breast, or colon cancers.      - Paternal grandfather - colon cancer  Denies personal/family history of bleeding or clotting disorders  Denies personal/family history of genetic disorders     - Patient and her sister with endometriosis    Social History:  Social History     Socioeconomic History    Marital status: Single     Spouse name: None    Number of children: None    Years of education: None    Highest education level: None   Occupational History    None   Tobacco Use    Smoking status: Current Every Day Smoker     Packs/day: 0.25     Years: 14.00     Pack years: 3.50     Types: Cigarettes    Smokeless tobacco: Never Used   Vaping Use    Vaping Use: Never used   Substance and Sexual Activity    Alcohol use: Yes     Comment: 3 x week    Drug use: No    Sexual activity: Yes     Partners: Male     Birth control/protection: None   Other Topics Concern    None   Social History Narrative    None     Social Determinants of Health     Financial Resource Strain:     Difficulty of Paying Living Expenses: Not on file   Food Insecurity:     Worried About Running Out of Food in the Last Year: Not on file    Ran Out of Food in the Last Year: Not on file   Transportation Needs:     Lack of Transportation (Medical): Not on file    Lack of Transportation (Non-Medical): Not on file   Physical Activity:     Days of Exercise per Week: Not on file    Minutes of Exercise per Session: Not on file   Stress:     Feeling of Stress : Not on file   Social Connections:     Frequency of Communication with Friends and Family: Not on file    Frequency of Social Gatherings with Friends and Family: Not on file    Attends [employer] Services: Not on file    Active Member of [address] [place] [address] [employer] or Organizations: Not on file    Attends Club or Organization Meetings: Not on file    Marital Status: Not on file   Intimate Partner Violence:     Fear of Current or Ex-Partner: Not on file    Emotionally Abused: Not on file    Physically Abused: Not on file    Sexually Abused: Not on file   Housing Stability:     Unable to Pay for Housing in the Last Year: Not on file    Number of [place] in the Last Year: Not on file    Unstable Housing in the Last Year: Not on file       Objective:  /80 (Site: Left Upper Arm, Position: Sitting, Cuff Size: Medium Adult)   Pulse 105   Temp 97.1 °F (36.2 °C) (Temporal)   Ht 5' 2\" (1.575 m)   Wt 97 lb 9.6 oz (44.3 kg)   LMP 01/17/2022 (Exact Date)   BMI 17.85 kg/m²     Exam:   Physical Exam  Vitals reviewed. Exam conducted with a chaperone present. Constitutional:       General: She is not in acute distress. Appearance: She is well-developed. HENT:      Head: Normocephalic and atraumatic. Eyes:      Conjunctiva/sclera: Conjunctivae normal.   Neck:      Thyroid: No thyromegaly. Cardiovascular:      Rate and Rhythm: Normal rate and regular rhythm. Heart sounds: No murmur heard. Pulmonary:      Effort: Pulmonary effort is normal. No respiratory distress. Chest:   Breasts:      Right: No mass, nipple discharge, skin change or tenderness. Left: No mass, nipple discharge, skin change or tenderness. Comments: Bilateral breast augmentation  Abdominal:      General: There is no distension. Palpations: Abdomen is soft. There is no mass. Tenderness: There is no abdominal tenderness. There is no guarding or rebound. Genitourinary:     General: Normal vulva. Exam position: Lithotomy position. Labia:         Right: No rash, tenderness or lesion. Left: No rash, tenderness or lesion. Vagina: No signs of injury. No vaginal discharge, erythema, tenderness, bleeding, lesions or prolapsed vaginal walls. Cervix: No cervical motion tenderness, discharge, friability, lesion, erythema or cervical bleeding. Uterus: Not deviated, not enlarged, not fixed and not tender. Adnexa:         Right: No mass, tenderness or fullness. Left: No mass, tenderness or fullness. Musculoskeletal:         General: No swelling. Cervical back: Normal range of motion and neck supple. Skin:     General: Skin is warm and dry. Neurological:      Mental Status: She is alert and oriented to person, place, and time. Psychiatric:         Mood and Affect: Mood normal.         Behavior: Behavior normal.         Thought Content: Thought content normal.         Assessment/Plan:  39 y.o. Turner Sanchez presenting for her annual exam:    1. Encntr for gyn exam (general) (routine) w/o abn findings     - Pap smear collected today - will call with results     - Age based screening recommendations discussed     - Self breast exams/awareness discussed with the patient     - Healthy lifestyle habits discussed including calcium and vitamin D supplementation     - Will follow-up in 1 year for annual exam     2.  Pap smear for cervical cancer screening     - Pap smear collected today - will call with results     - Age based screening recommendations discussed    3. Encounter for screening mammogram for malignant neoplasm of breast     - MERRY DIGITAL SCREENING AUGMENTED BILATERAL; Future     - Age based screening recommendations discussed     - Self breast exams/awareness discussed with the patient    4. Tobacco user     - Reviewed cesstion    5. Hemorrhoids, unspecified hemorrhoid type     - Has followed with surgery in the past for such     - Will call their office - reviewed can provide referral if desires    6.  Possible exposure to STD     - Cultures collected today    - C.trachomatis N.gonorrhoeae DNA     - Will call with and treat based on results     - Reviewed STI Prevention       Corina Miguel DO

## 2022-02-08 LAB
C TRACH DNA GENITAL QL NAA+PROBE: NEGATIVE
N. GONORRHOEAE DNA: NEGATIVE

## 2022-02-10 LAB
HPV COMMENT: ABNORMAL
HPV TYPE 16: NOT DETECTED
HPV TYPE 18: NOT DETECTED
HPVOH (OTHER TYPES): DETECTED

## 2022-02-25 ENCOUNTER — OFFICE VISIT (OUTPATIENT)
Dept: OBGYN CLINIC | Age: 42
End: 2022-02-25
Payer: COMMERCIAL

## 2022-02-25 VITALS
SYSTOLIC BLOOD PRESSURE: 124 MMHG | TEMPERATURE: 97.4 F | DIASTOLIC BLOOD PRESSURE: 86 MMHG | BODY MASS INDEX: 18 KG/M2 | HEART RATE: 94 BPM | WEIGHT: 98.4 LBS

## 2022-02-25 DIAGNOSIS — R87.618 PAP SMEAR ABNORMALITY OF CERVIX/HUMAN PAPILLOMAVIRUS (HPV) POSITIVE: ICD-10-CM

## 2022-02-25 DIAGNOSIS — R87.612 LOW GRADE SQUAMOUS INTRAEPITH LESION ON CYTOLOGIC SMEAR CERVIX (LGSIL): Primary | ICD-10-CM

## 2022-02-25 DIAGNOSIS — Z32.02 URINE PREGNANCY TEST NEGATIVE: ICD-10-CM

## 2022-02-25 PROCEDURE — 57454 BX/CURETT OF CERVIX W/SCOPE: CPT | Performed by: OBSTETRICS & GYNECOLOGY

## 2022-02-25 PROCEDURE — 81025 URINE PREGNANCY TEST: CPT | Performed by: OBSTETRICS & GYNECOLOGY

## 2022-02-25 NOTE — PATIENT INSTRUCTIONS
Cigarette smoking is a preventable cause of death in the [place]. If you have thought about quitting but haven't been able to, here are some reasons why you should and some ways to do it. Here's Why   Quitting smoking now can decrease your risk of getting smoking-related illnesses like:   Heart disease   Stroke   Several types of cancer, including:   Lung   Mouth   Esophagus   Larynx   Bladder   Pancreas   Kidney   Chronic lung diseases:   Bronchitis   Emphysema   Asthma   Cataracts   Macular degeneration   Thyroid conditions   Hearing loss   Erectile dysfunction   Dementia   Osteoporosis   Here's How   Once you've decided to quit smoking, set your target quit date a few weeks away. In the time leading up to your quit day, try some of these ideas offered by the [address] to help you successfully quit smoking. For the best results, work with your doctor. Together, you can test your lung function and compare the results to those of a nonsmoking person. The results can be given to you as your lung age. Finding out your lung age right after having the test done may help you to stop smoking. Your doctor can also discuss with you all of your options and refer you to smoking-cessation support groups. You may wish to use nicotine replacement (gum, patches, inhaler) or one of the prescription medications that have been shown to increase quit rates and prolong abstinence from smoking. But whatever you and your doctor decide on these matters, it will still be you who decides when an how to quit. Here are some techniques:   Switch Brands   Switch to a brand you find distasteful. Change to a brand that is low in tar and nicotine a couple of weeks before your target quit date. This will help change your smoking behavior. However, do not smoke more cigarettes, inhale them more often or more deeply, or place your fingertips over the holes in the filters.  All of these actions will increase your nicotine intake, and the idea is to get your body used to functioning without nicotine. Cut Down the Number of Cigarettes You Smoke   Smoke only half of each cigarette. Each day, postpone the lighting of your first cigarette by one hour. Decide you'll only smoke during odd or even hours of the day. Decide beforehand how many cigarettes you'll smoke during the day. For each additional cigarette, give a dollar to your favorite prudence. Change your eating habits to help you cut down. For example, drink milk, which many people consider incompatible with smoking. End meals or snacks with something that won't lead to a cigarette. Reach for a glass of juice instead of a cigarette for a \"pick-me-up. \"   Remember: Cutting down can help you quit, but it's not a substitute for quitting. If you're down to about seven cigarettes a day, it's time to set your target quit date, and get ready to stick to it. Don't Smoke \"Automatically\"   Smoke only those cigarettes you really want. Catch yourself before you light up a cigarette out of pure habit. Don't empty your ashtrays. This will remind you of how many cigarettes you've smoked each day, and the sight and the smell of stale cigarettes butts will be very unpleasant. Make yourself aware of each cigarette by using the opposite hand or putting cigarettes in an unfamiliar location or a different pocket to break the automatic reach. If you light up many times during the day without even thinking about it, try to look in a mirror each time you put a match to your cigarette. You may decide you don't need it. Make Smoking Inconvenient   Stop buying cigarettes by the carton. Wait until one pack is empty before you buy another. Stop carrying cigarettes with you at home or at work. Make them difficult to get to. Make Smoking Unpleasant   Smoke only under circumstances that aren't especially pleasurable for you.  If you like to smoke with others, smoke alone. Turn your chair to an empty corner and focus only on the cigarette you are smoking and all its many negative effects. Collect all your cigarette butts in one large glass container as a visual reminder of the filth made by smoking. Just Before Quitting   Practice going without cigarettes. Don't think of never smoking again. Think of quitting in terms of one day at a time . Tell yourself you won't smoke today, and then don't. Clean your clothes to rid them of the cigarette smell, which can linger a long time. On the Day You Quit   Throw away all your cigarettes and matches. Hide your lighters and ashtrays. Visit the dentist and have your teeth cleaned to get rid of tobacco stains. Notice how nice they look and resolve to keep them that way. Make a list of things you'd like to buy for yourself or someone else. Estimate the cost in terms of packs of cigarettes, and put the money aside to buy these presents. Keep very busy on the big day. Go to the movies, exercise, take long walks, or go bike riding. Remind your family and friends that this is your quit date, and ask them to help you over the rough spots of the first couple of days and weeks. Buy yourself a treat or do something special to celebrate. Telephone and Internet Support   Telephone, web-, and computer-based programs can offer you the support that you need to quit and to stay smoke-free. You can find many programs online, like the American Lung Association's [place] from Smoking . Immediately After Quitting   Develop a clean, fresh, nonsmoking environment around yourselfat work and at home. Buy yourself flowersyou may be surprised how much you can enjoy their scent now. The first few days after you quit, spend as much free time as possible in places where smoking isn't allowed, such as [address] [place] [address] Street, museums, theaters, department stores, and churches.    Drink large quantities of water and fruit juice (but avoid sodas that contain caffeine). Try to avoid alcohol, coffee, and other beverages that you associate with cigarette smoking. Strike up conversation instead of a match for a cigarette. If you miss the sensation of having a cigarette in your hand, play with something elsea pencil, a paper clip, a marble. If you miss having something in your mouth, try toothpicks or a fake cigarette. Here are some useful phone numbers and resources for you to help your smoking cessation. [address] [place] [address]: [phone]  Smoking cessation programs in [facility]: 832.619.2552  \"Freshstart\" a 4-session program for smoking cessation - group sessions    [facility] Use Prevention and Control [facility]: 1800-QUIT-NOW     [facility]: 958-800-XDTU  \"Freshstart' - 4-session program for smoking cessation - group sessions    [facility]: [phone]  Personal Smoking cessation consultations - teens and adults  [facility] By appointment $113    [facility]: [phone]  Offers individual hypnosis, behavior modifications, and counseling in this program. Three sessions over 2-week period  $155 (total cost)    Nicotine Anonymous: 460.993.6000  Open group cessation - everyone welcome     American Cancer Society: 783.392.9571    American Lung Association: 1-800-LUNG-[place]    On-line help:  www.cancer. org  www.quitnet. org  www. whyquit. com  www.stopsmokingsuppport. com  www. ffsonline. org

## 2022-02-25 NOTE — PROGRESS NOTES
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 Procedure Details   The risks and benefits of the procedure were discussed in detail with the patient. She was aware the risks may include, but are not limited to bleeding, infection and damage to surround structures. She acknowledged these risks and elected to proceed. Written consent was obtained. Urine HCG testing (today): Negative    A speculum was placed in vagina and excellent visualization of cervix was achieved, the cervix was swabbed x3 with acetic acid solution. Biopsies were obtained at 10 o'clock and 5 o'clcok. ECC was performed. Colposcopy was adequate. Findings:  Cervical Visibility: Yes  SCJ Visibility: Yes   Lesion: Yes  Abnormal Colposcopic Findings: Transformation zone visualized in its entirety. Acetowhite lesions noted at the 10 o'clock and 5 o'clock positions with fine mosaicism noted at both locations. Impression: LSIL      Specimens:  1. ECC  2. Cervical biopsy at 10 o'clock  3. Cervical biopsy at 5 o'clock    EBL: Minimal    Complications: none. Physical Exam  Exam conducted with a chaperone present. Genitourinary:              Plan:  Specimens labeled and sent to Pathology.   Post-procedure care instructions reviewed  Will follow-up with results    Yasmeen Bailey DO

## 2022-04-18 ENCOUNTER — OFFICE VISIT (OUTPATIENT)
Dept: OBGYN CLINIC | Age: 42
End: 2022-04-18

## 2022-04-18 VITALS
SYSTOLIC BLOOD PRESSURE: 132 MMHG | DIASTOLIC BLOOD PRESSURE: 84 MMHG | TEMPERATURE: 97.2 F | BODY MASS INDEX: 18.58 KG/M2 | HEART RATE: 88 BPM | WEIGHT: 101.6 LBS

## 2022-04-18 DIAGNOSIS — D06.9 HIGH GRADE SQUAMOUS INTRAEPITHELIAL LESION (HGSIL), GRADE 3 CIN, ON BIOPSY OF CERVIX: Primary | ICD-10-CM

## 2022-04-18 PROCEDURE — 99024 POSTOP FOLLOW-UP VISIT: CPT | Performed by: OBSTETRICS & GYNECOLOGY

## 2022-04-18 NOTE — PROGRESS NOTES
Return Gyn Office Visit    CC:   Chief Complaint   Patient presents with    Pre-op Exam       HPI:  Fei Montiel is a 39 y.o. female who presents to sign consents for upcoming loop electrosurgical excisional procedure. Patient is seen and examined today. Patient is overall doing well today. Patient presents today with history of Pap smear showing LSIL, HPV other types positive. Colposcopy revealed OBEY 2-3 HGSIL at the 5 o'clock biopsy site. ECC was negative. Denies chest pain, shortness of breath, fever, chills, nausea, vomiting. Review of Systems - The following ROS was otherwise negative, except as noted in the HPI: constitutional, respiratory, cardiovascular, gastrointestinal, genitourinary    Objective:  /84 (Site: Left Upper Arm, Position: Sitting, Cuff Size: Medium Adult)   Pulse 88   Temp 97.2 °F (36.2 °C) (Infrared)   Wt 101 lb 9.6 oz (46.1 kg)   BMI 18.58 kg/m²     Physical Exam  Vitals reviewed. Constitutional:       General: She is not in acute distress. Appearance: She is well-developed. HENT:      Head: Normocephalic and atraumatic. Eyes:      Conjunctiva/sclera: Conjunctivae normal.   Cardiovascular:      Rate and Rhythm: Normal rate. Pulmonary:      Effort: Pulmonary effort is normal. No respiratory distress. Musculoskeletal:         General: No swelling. Skin:     General: Skin is warm and dry. Neurological:      Mental Status: She is alert and oriented to person, place, and time. Psychiatric:         Mood and Affect: Mood normal.         Behavior: Behavior normal.         Thought Content: Thought content normal.       Pap Smear:   GENERAL CATEGORIZATION:   Epithelial Cell Abnormality     INTERPRETATION/RESULTS:   Low grade squamous intraepithelial lesion. SPECIMEN ADEQUACY:   Satisfactory for Evaluation.  Endocervical cells/transformation zone   component present.        Lab Order#: [id number]     Test Name                             Collected D&T      Result   HPV Type 16                           02/07/2022         Not Detected   HPV Type 18                           02/07/2022         Not Detected   HPVOH (Other types)                   02/07/2022         DETECTED   HPV Comment                           02/07/2022         See below   Test Comment: **This is information only.  See above for results. **     HPV other genotypes: 44,07,18,98,73,93,49,28,31,23,80,45     Pathology:   FINAL DIAGNOSIS:     A. ECC:   - Scant benign endocervical mucosa. - Negative for dysplasia or malignancy. B. Cervix, 10:00, biopsy:   - Unremarkable ectocervical mucosa. - Negative for dysplasia or malignancy. C. Cervix 5:00, biopsy:   - High-grade squamous intraepithelial neoplasia (HSIL/OBEY II-III). COMMENT:    There are multiple areas showing atypical squamous cells in   C. Immunostain for P16 is performed in order to determine the cellular   changes represent high-grade intraepithelial neoplasia vs immature   squamous metaplasia. There is relatively diffuse P16 staining in the   small areas of concern. The overall findings are consistent with HSIL. Controls stained appropriately.       CARLA/CARLA     Assessment/Plan:     Zenia Johnson is a 39 y.o. female who presents to sign consents for upcoming loop electrosurgical excisional procedure. 1. High grade squamous intraepithelial lesion (HGSIL), grade 3 OBEY, on biopsy of cervix     - Patient with pap smear showing LSIL with HR HPV other types positive     - Office colposcopy shows OBEY 2-3 at the 5 o'clock biopsy site     - ECC negative     - Risks, benefits and alternatives were reviewed with the patient     - All questions were answered to the patient's satisfaction     - Consents are signed and in chart     - Has H&P with PCP      - Pre-op instructions reviewed     - Post-op instructions reviewed     - Will follow-up for procedure as scheduled.      The patient was counseled at length about the risks of deyvi Covid-19 during their perioperative period and any recovery window from their procedure. The patient was made aware that deyvi Covid-19  may worsen their prognosis for recovering from their procedure  and lend to a higher morbidity and/or mortality risk. All material risks, benefits, and reasonable alternatives including postponing the procedure were discussed. The patient does wish to proceed with the procedure at this time.          Ashley Kay, DO

## 2022-04-20 NOTE — PROGRESS NOTES
[place]    Age 39 y.o.    female    1980    KANE [id number]    4/28/2022  Arrival Time_____________  OR Time____________[address]     Procedure(s):  LOOP ELECTROSURGICAL EXCISION PROCEDURE                      General    Surgeon(s):  Ulysses Pacer, DO       Phone [phone] (home) [phone] (work)    [address]  Cell         Work  _____________________________________________________________________  _____________________________________________________________________  _____________________________________________________________________  _____________________________________________________________________  _____________________________________________________________________    PCP _____________________________ Phone_________________     H&P__________________Bringing      Chart            Epic   DOS      Called________  EKG__________________Bringing      Chart            Epic   DOS      Called________  LAB__________________ Bringing      Chart            Epic   DOS      Called________  Cardiac Clearance_______Bringing      Chart            Epic      DOS      Called________    Cardiologist________________________ Phone___________________________    ? [facility] concerns / Waiver on Chart            [employer]________________  ? Pre-op Instructions Given [place]          _________________________________  ? Directions to Surgery Center                          _________________________________  ? Transportation Home_______________      __________________________________  ?  Crutches/Walker__________________        __________________________________    ________Pre-op Orders   _______Transcribed    _______Wt.  ________Pharmacy          _______SCD  ______VTE     ______[facility] [place]  _______  Surgery Consent    _______  Anesthesia Consent         COVID DATE______________LOCATION________________ RESULT__________

## 2022-04-21 NOTE — PROGRESS NOTES
Obstructive Sleep Apnea (SHAHLA) Screening     Patient:  Sylvester Das    YOB: 1980      Medical Record #:  [id number]                     Date:  4/21/2022     1. Are you a loud and/or regular snorer? []  Yes       [x] No    2. Have you been observed to gasp or stop breathing during sleep? []  Yes       [x] No    3. Do you feel tired or groggy upon awakening or do you awaken with a headache?           []  Yes       [] No    4. Are you often tired or fatigued during the wake time hours? []  Yes       [] No    5. Do you fall asleep sitting, reading, watching TV or driving? []  Yes       [] No    6. Do you often have problems with memory or concentration? []  Yes       [] No    **If patient's score is ? 3 they are considered high risk for SHAHLA. An Anesthesia provider will evaluate the patient and develop a plan of care the day of surgery. Note:  If the patient's BMI is more than 35 kg m¯² , has neck circumference > 40 cm, and/or high blood pressure the risk is greater (© American Sleep Apnea Association, 2006).

## 2022-04-21 NOTE — PROGRESS NOTES

## 2022-04-28 ENCOUNTER — HOSPITAL ENCOUNTER (OUTPATIENT)
Age: 42
Setting detail: OUTPATIENT SURGERY
Discharge: HOME | End: 2022-04-28
Attending: OBSTETRICS & GYNECOLOGY | Admitting: OBSTETRICS & GYNECOLOGY
Payer: COMMERCIAL

## 2022-04-28 ENCOUNTER — ANESTHESIA (OUTPATIENT)
Dept: OPERATING ROOM | Age: 42
End: 2022-04-28
Payer: COMMERCIAL

## 2022-04-28 ENCOUNTER — ANESTHESIA EVENT (OUTPATIENT)
Dept: OPERATING ROOM | Age: 42
End: 2022-04-28
Payer: COMMERCIAL

## 2022-04-28 VITALS
DIASTOLIC BLOOD PRESSURE: 95 MMHG | SYSTOLIC BLOOD PRESSURE: 149 MMHG | BODY MASS INDEX: 18.4 KG/M2 | HEIGHT: 62 IN | OXYGEN SATURATION: 97 % | RESPIRATION RATE: 17 BRPM | HEART RATE: 108 BPM | TEMPERATURE: 96.6 F | WEIGHT: 100 LBS

## 2022-04-28 VITALS
OXYGEN SATURATION: 97 % | RESPIRATION RATE: 3 BRPM | DIASTOLIC BLOOD PRESSURE: 41 MMHG | SYSTOLIC BLOOD PRESSURE: 77 MMHG | TEMPERATURE: 97.2 F

## 2022-04-28 DIAGNOSIS — Z98.890 S/P LEEP (LOOP ELECTROSURGICAL EXCISION PROCEDURE): Primary | ICD-10-CM

## 2022-04-28 DIAGNOSIS — R87.612 LOW GRADE SQUAMOUS INTRAEPITHELIAL LESION ON CYTOLOGIC SMEAR OF CERVIX (LGSIL): ICD-10-CM

## 2022-04-28 DIAGNOSIS — R87.618 PAP SMEAR ABNORMALITY OF CERVIX/HUMAN PAPILLOMAVIRUS (HPV) POSITIVE: ICD-10-CM

## 2022-04-28 LAB
ABO/RH: NORMAL
ANION GAP SERPL CALCULATED.3IONS-SCNC: 15 MMOL/L (ref 3–16)
ANTIBODY SCREEN: NORMAL
BUN BLDV-MCNC: 10 MG/DL (ref 7–20)
CALCIUM SERPL-MCNC: 10.7 MG/DL (ref 8.3–10.6)
CHLORIDE BLD-SCNC: 99 MMOL/L (ref 99–110)
CO2: 22 MMOL/L (ref 21–32)
CREAT SERPL-MCNC: 0.9 MG/DL (ref 0.6–1.1)
GFR AFRICAN AMERICAN: >60
GFR NON-AFRICAN AMERICAN: >60
GLUCOSE BLD-MCNC: 104 MG/DL (ref 70–99)
GLUCOSE BLD-MCNC: 108 MG/DL (ref 70–99)
GLUCOSE BLD-MCNC: 99 MG/DL (ref 70–99)
HCT VFR BLD CALC: 39.7 % (ref 36–48)
HEMOGLOBIN: 13 G/DL (ref 12–16)
MCH RBC QN AUTO: 31.2 PG (ref 26–34)
MCHC RBC AUTO-ENTMCNC: 32.6 G/DL (ref 31–36)
MCV RBC AUTO: 95.5 FL (ref 80–100)
PDW BLD-RTO: 13.9 % (ref 12.4–15.4)
PERFORMED ON: ABNORMAL
PERFORMED ON: NORMAL
PLATELET # BLD: 331 K/UL (ref 135–450)
PMV BLD AUTO: 8 FL (ref 5–10.5)
POTASSIUM SERPL-SCNC: 4.3 MMOL/L (ref 3.5–5.1)
PREGNANCY, URINE: NEGATIVE
RBC # BLD: 4.16 M/UL (ref 4–5.2)
SODIUM BLD-SCNC: 136 MMOL/L (ref 136–145)
WBC # BLD: 10.2 K/UL (ref 4–11)

## 2022-04-28 PROCEDURE — 84703 CHORIONIC GONADOTROPIN ASSAY: CPT

## 2022-04-28 PROCEDURE — 2500000003 HC RX 250 WO HCPCS: Performed by: OBSTETRICS & GYNECOLOGY

## 2022-04-28 PROCEDURE — 86900 BLOOD TYPING SEROLOGIC ABO: CPT

## 2022-04-28 PROCEDURE — 6360000002 HC RX W HCPCS: Performed by: NURSE ANESTHETIST, CERTIFIED REGISTERED

## 2022-04-28 PROCEDURE — 88342 IMHCHEM/IMCYTCHM 1ST ANTB: CPT

## 2022-04-28 PROCEDURE — 3600000013 HC SURGERY LEVEL 3 ADDTL 15MIN: Performed by: OBSTETRICS & GYNECOLOGY

## 2022-04-28 PROCEDURE — 3700000000 HC ANESTHESIA ATTENDED CARE: Performed by: OBSTETRICS & GYNECOLOGY

## 2022-04-28 PROCEDURE — A4217 STERILE WATER/SALINE, 500 ML: HCPCS | Performed by: OBSTETRICS & GYNECOLOGY

## 2022-04-28 PROCEDURE — 3600000003 HC SURGERY LEVEL 3 BASE: Performed by: OBSTETRICS & GYNECOLOGY

## 2022-04-28 PROCEDURE — 88307 TISSUE EXAM BY PATHOLOGIST: CPT

## 2022-04-28 PROCEDURE — 85027 COMPLETE CBC AUTOMATED: CPT

## 2022-04-28 PROCEDURE — 88305 TISSUE EXAM BY PATHOLOGIST: CPT

## 2022-04-28 PROCEDURE — 2709999900 HC NON-CHARGEABLE SUPPLY: Performed by: OBSTETRICS & GYNECOLOGY

## 2022-04-28 PROCEDURE — 86901 BLOOD TYPING SEROLOGIC RH(D): CPT

## 2022-04-28 PROCEDURE — 80048 BASIC METABOLIC PNL TOTAL CA: CPT

## 2022-04-28 PROCEDURE — 2580000003 HC RX 258: Performed by: ANESTHESIOLOGY

## 2022-04-28 PROCEDURE — 7100000010 HC PHASE II RECOVERY - FIRST 15 MIN: Performed by: OBSTETRICS & GYNECOLOGY

## 2022-04-28 PROCEDURE — 2500000003 HC RX 250 WO HCPCS: Performed by: NURSE ANESTHETIST, CERTIFIED REGISTERED

## 2022-04-28 PROCEDURE — 2580000003 HC RX 258: Performed by: OBSTETRICS & GYNECOLOGY

## 2022-04-28 PROCEDURE — 7100000001 HC PACU RECOVERY - ADDTL 15 MIN: Performed by: OBSTETRICS & GYNECOLOGY

## 2022-04-28 PROCEDURE — 3700000001 HC ADD 15 MINUTES (ANESTHESIA): Performed by: OBSTETRICS & GYNECOLOGY

## 2022-04-28 PROCEDURE — 86850 RBC ANTIBODY SCREEN: CPT

## 2022-04-28 PROCEDURE — 6370000000 HC RX 637 (ALT 250 FOR IP)

## 2022-04-28 PROCEDURE — 2720000010 HC SURG SUPPLY STERILE: Performed by: OBSTETRICS & GYNECOLOGY

## 2022-04-28 PROCEDURE — 7100000011 HC PHASE II RECOVERY - ADDTL 15 MIN: Performed by: OBSTETRICS & GYNECOLOGY

## 2022-04-28 PROCEDURE — 7100000000 HC PACU RECOVERY - FIRST 15 MIN: Performed by: OBSTETRICS & GYNECOLOGY

## 2022-04-28 PROCEDURE — 57522 CONIZATION OF CERVIX: CPT | Performed by: OBSTETRICS & GYNECOLOGY

## 2022-04-28 RX ORDER — IBUPROFEN 600 MG/1
600 TABLET ORAL EVERY 8 HOURS PRN
Qty: 60 TABLET | Refills: 0 | Status: SHIPPED | OUTPATIENT
Start: 2022-04-28

## 2022-04-28 RX ORDER — ONDANSETRON 2 MG/ML
INJECTION INTRAMUSCULAR; INTRAVENOUS PRN
Status: DISCONTINUED | OUTPATIENT
Start: 2022-04-28 | End: 2022-04-28 | Stop reason: SDUPTHER

## 2022-04-28 RX ORDER — SODIUM CHLORIDE 0.9 % (FLUSH) 0.9 %
5-40 SYRINGE (ML) INJECTION EVERY 12 HOURS SCHEDULED
Status: DISCONTINUED | OUTPATIENT
Start: 2022-04-28 | End: 2022-04-28 | Stop reason: HOSPADM

## 2022-04-28 RX ORDER — OXYCODONE HYDROCHLORIDE 5 MG/1
5 TABLET ORAL PRN
Status: DISCONTINUED | OUTPATIENT
Start: 2022-04-28 | End: 2022-04-28 | Stop reason: HOSPADM

## 2022-04-28 RX ORDER — LIDOCAINE HYDROCHLORIDE 10 MG/ML
0.3 INJECTION, SOLUTION EPIDURAL; INFILTRATION; INTRACAUDAL; PERINEURAL
Status: DISCONTINUED | OUTPATIENT
Start: 2022-04-28 | End: 2022-04-28 | Stop reason: HOSPADM

## 2022-04-28 RX ORDER — SODIUM CHLORIDE 0.9 % (FLUSH) 0.9 %
5-40 SYRINGE (ML) INJECTION PRN
Status: DISCONTINUED | OUTPATIENT
Start: 2022-04-28 | End: 2022-04-28 | Stop reason: HOSPADM

## 2022-04-28 RX ORDER — ACETIC ACID 0.25 G/100ML
IRRIGANT IRRIGATION PRN
Status: DISCONTINUED | OUTPATIENT
Start: 2022-04-28 | End: 2022-04-28 | Stop reason: HOSPADM

## 2022-04-28 RX ORDER — SODIUM CHLORIDE 9 MG/ML
25 INJECTION, SOLUTION INTRAVENOUS PRN
Status: DISCONTINUED | OUTPATIENT
Start: 2022-04-28 | End: 2022-04-28 | Stop reason: HOSPADM

## 2022-04-28 RX ORDER — FENTANYL CITRATE 50 UG/ML
INJECTION, SOLUTION INTRAMUSCULAR; INTRAVENOUS PRN
Status: DISCONTINUED | OUTPATIENT
Start: 2022-04-28 | End: 2022-04-28 | Stop reason: SDUPTHER

## 2022-04-28 RX ORDER — PROPOFOL 10 MG/ML
INJECTION, EMULSION INTRAVENOUS PRN
Status: DISCONTINUED | OUTPATIENT
Start: 2022-04-28 | End: 2022-04-28 | Stop reason: SDUPTHER

## 2022-04-28 RX ORDER — SODIUM CHLORIDE, SODIUM LACTATE, POTASSIUM CHLORIDE, CALCIUM CHLORIDE 600; 310; 30; 20 MG/100ML; MG/100ML; MG/100ML; MG/100ML
INJECTION, SOLUTION INTRAVENOUS CONTINUOUS
Status: DISCONTINUED | OUTPATIENT
Start: 2022-04-28 | End: 2022-04-28 | Stop reason: HOSPADM

## 2022-04-28 RX ORDER — SODIUM CHLORIDE 9 MG/ML
INJECTION, SOLUTION INTRAVENOUS PRN
Status: DISCONTINUED | OUTPATIENT
Start: 2022-04-28 | End: 2022-04-28 | Stop reason: HOSPADM

## 2022-04-28 RX ORDER — MIDAZOLAM HYDROCHLORIDE 1 MG/ML
INJECTION INTRAMUSCULAR; INTRAVENOUS PRN
Status: DISCONTINUED | OUTPATIENT
Start: 2022-04-28 | End: 2022-04-28 | Stop reason: SDUPTHER

## 2022-04-28 RX ORDER — LIDOCAINE HYDROCHLORIDE AND EPINEPHRINE 10; 10 MG/ML; UG/ML
INJECTION, SOLUTION INFILTRATION; PERINEURAL PRN
Status: DISCONTINUED | OUTPATIENT
Start: 2022-04-28 | End: 2022-04-28 | Stop reason: HOSPADM

## 2022-04-28 RX ORDER — HYDROCODONE BITARTRATE AND ACETAMINOPHEN 5; 325 MG/1; MG/1
1 TABLET ORAL EVERY 6 HOURS PRN
Qty: 12 TABLET | Refills: 0 | Status: SHIPPED | OUTPATIENT
Start: 2022-04-28 | End: 2022-05-01

## 2022-04-28 RX ORDER — DEXAMETHASONE SODIUM PHOSPHATE 4 MG/ML
INJECTION, SOLUTION INTRA-ARTICULAR; INTRALESIONAL; INTRAMUSCULAR; INTRAVENOUS; SOFT TISSUE PRN
Status: DISCONTINUED | OUTPATIENT
Start: 2022-04-28 | End: 2022-04-28 | Stop reason: SDUPTHER

## 2022-04-28 RX ORDER — MEPERIDINE HYDROCHLORIDE 50 MG/ML
12.5 INJECTION INTRAMUSCULAR; INTRAVENOUS; SUBCUTANEOUS EVERY 5 MIN PRN
Status: DISCONTINUED | OUTPATIENT
Start: 2022-04-28 | End: 2022-04-28 | Stop reason: HOSPADM

## 2022-04-28 RX ORDER — OXYCODONE HYDROCHLORIDE 5 MG/1
10 TABLET ORAL PRN
Status: DISCONTINUED | OUTPATIENT
Start: 2022-04-28 | End: 2022-04-28 | Stop reason: HOSPADM

## 2022-04-28 RX ORDER — MAGNESIUM HYDROXIDE 1200 MG/15ML
LIQUID ORAL CONTINUOUS PRN
Status: DISCONTINUED | OUTPATIENT
Start: 2022-04-28 | End: 2022-04-28 | Stop reason: HOSPADM

## 2022-04-28 RX ORDER — ONDANSETRON 2 MG/ML
4 INJECTION INTRAMUSCULAR; INTRAVENOUS
Status: DISCONTINUED | OUTPATIENT
Start: 2022-04-28 | End: 2022-04-28 | Stop reason: HOSPADM

## 2022-04-28 RX ADMIN — FENTANYL CITRATE 50 MCG: 50 INJECTION INTRAMUSCULAR; INTRAVENOUS at 07:28

## 2022-04-28 RX ADMIN — LIDOCAINE HYDROCHLORIDE 60 MG: 10 INJECTION, SOLUTION INFILTRATION; PERINEURAL at 07:22

## 2022-04-28 RX ADMIN — DEXAMETHASONE SODIUM PHOSPHATE 8 MG: 4 INJECTION, SOLUTION INTRAMUSCULAR; INTRAVENOUS at 07:30

## 2022-04-28 RX ADMIN — PROPOFOL 120 MG: 10 INJECTION, EMULSION INTRAVENOUS at 07:22

## 2022-04-28 RX ADMIN — ONDANSETRON 4 MG: 2 INJECTION INTRAMUSCULAR; INTRAVENOUS at 07:30

## 2022-04-28 RX ADMIN — SODIUM CHLORIDE, POTASSIUM CHLORIDE, SODIUM LACTATE AND CALCIUM CHLORIDE: 600; 310; 30; 20 INJECTION, SOLUTION INTRAVENOUS at 06:30

## 2022-04-28 RX ADMIN — MIDAZOLAM HYDROCHLORIDE 2 MG: 2 INJECTION, SOLUTION INTRAMUSCULAR; INTRAVENOUS at 07:15

## 2022-04-28 ASSESSMENT — PULMONARY FUNCTION TESTS
PIF_VALUE: 3
PIF_VALUE: 3
PIF_VALUE: 8
PIF_VALUE: 8
PIF_VALUE: 3
PIF_VALUE: 3
PIF_VALUE: 0
PIF_VALUE: 1
PIF_VALUE: 25
PIF_VALUE: 3
PIF_VALUE: 3
PIF_VALUE: 1
PIF_VALUE: 1
PIF_VALUE: 3
PIF_VALUE: 0
PIF_VALUE: 25
PIF_VALUE: 8
PIF_VALUE: 24
PIF_VALUE: 10
PIF_VALUE: 8
PIF_VALUE: 0
PIF_VALUE: 2
PIF_VALUE: 3
PIF_VALUE: 8
PIF_VALUE: 7
PIF_VALUE: 9
PIF_VALUE: 3
PIF_VALUE: 3
PIF_VALUE: 1
PIF_VALUE: 8
PIF_VALUE: 8
PIF_VALUE: 9
PIF_VALUE: 2
PIF_VALUE: 1
PIF_VALUE: 1

## 2022-04-28 ASSESSMENT — PAIN - FUNCTIONAL ASSESSMENT: PAIN_FUNCTIONAL_ASSESSMENT: NONE - DENIES PAIN

## 2022-04-28 ASSESSMENT — LIFESTYLE VARIABLES: SMOKING_STATUS: 1

## 2022-04-28 ASSESSMENT — ENCOUNTER SYMPTOMS: SHORTNESS OF BREATH: 0

## 2022-04-28 NOTE — H&P
[facility] Ob/[facility]yn  History and Physical Attestation    Patient seen and evaluated prior to surgery. Patient reports no changes in medical condition. There have been no changes in the patient's medications or assessment. Preoperative tests and diagnostics have been reviewed. Surgery remains indicated as noted in History and Physical (H&P). Prophylactic antibiotics, use of beta-blockers and VTE prophylaxis have been addressed/ordered as indicated. Please see H&P and orders. History and Physical performed by patient's PCP Francisca Buchanan CNP on 4/11/2022 with \"cleared for procedure. \"       All questions were answered to the patient's satisfaction. Consents are signed and on chart. Will proceed with procedure as planned.       Maylin Mcnamara, DO

## 2022-04-28 NOTE — OP NOTE
Department of Gynecology  Brief Operative Report    Pre-operative Diagnosis:    1. OBEY 3  2. Positive HPV    Post-operative Diagnosis:    1. OBEY 3  2. Positive HPV    Procedure:  Loop electrosurgical excisional procedure     Surgeon:  YARY Galvan DO      Assistant(s):  See operative record     Anesthesia:  General Laryngeal Mask Airway Anesthesia    Findings:    1. Cervix with acetowhite changes at the 10 o'clock and 5 o'clock positions as noted during Colposcopy  2. Otherwise normal appearing external genitalia    Total IV fluids:  600 ml    Urine Output:  20 ml    Estimated blood loss:  less than 50ml    Blood Transfusion?:  No      Drains:  none    Specimens:    1. Ectocervix tagged at 12 o'clock  2. Endocervix tagged at 12 o'clock  3. Endocervical curettings    Complications:  none    Condition:  Stable to PACU    See dictated operative report for full details.     Dictation ID#: [id number]    Lencho Iglesias DO

## 2022-04-28 NOTE — ANESTHESIA POSTPROCEDURE EVALUATION
Department of Anesthesiology  Postprocedure Note    Patient: Aamir Nelson  MRN: [id number]  YOB: 1980  Date of evaluation: 4/28/2022  Time:  9:39 AM     Procedure Summary     Date: 04/28/22 Room / Location: [address]    Anesthesia Start: 0715 Anesthesia Stop: 7287    Procedure: LOOP ELECTROSURGICAL EXCISION PROCEDURE (N/A Vagina ) Diagnosis:       Low grade squamous intraepithelial lesion on cytologic smear of cervix (LGSIL)      Pap smear abnormality of cervix/human papillomavirus (HPV) positive      (Low grade squamous intraepithelial lesion on cytologic smear of cervix (LGSIL) [R87.612] Pap smear abnormality of cervix/human papillomavirus (HPV) positive [R87.618])    Surgeons: Raffaele Horn DO Responsible Provider: Kami Escoto MD    Anesthesia Type: general ASA Status: 3          Anesthesia Type: general    Vanesa Phase I: Vanesa Score: 10    Vanesa Phase II: Vanesa Score: 10    Last vitals: Reviewed and per EMR flowsheets.      Vitals:    04/28/22 0840 04/28/22 0845 04/28/22 0850 04/28/22 0855   BP:  (!) 136/94 (!) 138/91 (!) 149/95   Pulse: 109 107 108 108   Resp: 16 13 19 17   Temp:   96.6 °F (35.9 °C)    TempSrc:       SpO2: 95% 98% 96% 97%   Weight:       Height:         Anesthesia Post Evaluation    Patient location during evaluation: bedside  Patient participation: complete - patient participated  Level of consciousness: awake and alert  Airway patency: patent  Nausea & Vomiting: no nausea  Complications: no  Cardiovascular status: hemodynamically stable  Respiratory status: acceptable  Hydration status: euvolemic

## 2022-04-28 NOTE — ANESTHESIA PRE PROCEDURE
Department of Anesthesiology  Preprocedure Note       Name:  Angeles Ocampo   Age:  39 y.o.  :  1980                                          MRN:  [id number]         Date:  2022      Surgeon: Kelly Kan):  Ashley Kay DO    Procedure: Procedure(s):  LOOP ELECTROSURGICAL EXCISION PROCEDURE    Medications prior to admission:   Prior to Admission medications    Medication Sig Start Date End Date Taking? Authorizing Provider   Cyanocobalamin (VITAMIN B-12 PO) Take by mouth every other day   Yes Historical Provider, MD   zinc sulfate (ZINCATE) 220 (50 Zn) MG capsule Take 50 mg by mouth daily    Historical Provider, MD   Multiple Vitamins-Minerals (WOMENS MULTIVITAMIN PO) Take by mouth daily    Historical Provider, MD   metformin (GLUCOPHAGE-XR) 500 MG XR tablet Take 500-1,000 mg by mouth in the morning and at bedtime Takes 500 mg in am and 1000 mg in evening 3/22/11   Historical Provider, MD   lisinopril (PRINIVIL;ZESTRIL) 10 MG tablet Take 10 mg by mouth daily. Historical Provider, MD   lovastatin (MEVACOR) 20 MG tablet Take 20 mg by mouth daily. 3/22/11   Historical Provider, MD       Current medications:    Current Facility-Administered Medications   Medication Dose Route Frequency Provider Last Rate Last Admin    lidocaine PF 1 % injection 0.3 mL  0.3 mL IntraDERmal Once PRN Sulema Wong MD        lactated ringers infusion   IntraVENous Continuous Sulema Wong  mL/hr at 22 0630 New Bag at 22 0630    sodium chloride flush 0.9 % injection 5-40 mL  5-40 mL IntraVENous 2 times per day Sulema Wong MD        sodium chloride flush 0.9 % injection 5-40 mL  5-40 mL IntraVENous PRN Sulema Wong MD        0.9 % sodium chloride infusion   IntraVENous PRN Sulema Wong MD           Allergies:     Allergies   Allergen Reactions    Codeine      Increases glucose       Problem List:    Patient Active Problem List   Diagnosis Code    Interstitial cystitis N30.10    Acute respiratory failure with hypoxia (HCC) S37.22    Acute systolic congestive heart failure (HCC) I50.21    Hyponatremia E87.1    QT prolongation R94.31    COVID-19 U07.1    Low grade squamous intraepith lesion on cytologic smear cervix (lgsil) R87.612    Pap smear abnormality of cervix/human papillomavirus (HPV) positive R87.618       Past Medical History:        Diagnosis Date    Abnormal Pap smear of cervix     COVID-19 09/22/2021    Diabetes mellitus ([address]     Endometriosis     Hyperlipidemia     Hypertension     Interstitial cystitis     past hx       Past Surgical History:        Procedure Laterality Date    BREAST SURGERY Bilateral 2012    implants    CHOLECYSTECTOMY      ENDOMETRIAL ABLATION      VULVA SURGERY  3/22/11    irrigation and debridement of right vulva       Social History:    Social History     Tobacco Use    Smoking status: Current Every Day Smoker     Packs/day: 0.50     Years: 14.00     Pack years: 7.00     Types: Cigarettes    Smokeless tobacco: Never Used   Substance Use Topics    Alcohol use: Yes     Comment: 3 drinks per week                                Ready to quit: Not Answered  Counseling given: Not Answered      Vital Signs (Current):   Vitals:    04/21/22 1039 04/28/22 0631   BP:  (!) 152/108   Pulse:  120   Resp:  18   Temp:  97.4 °F (36.3 °C)   TempSrc:  Temporal   SpO2:  100%   Weight: 98 lb (44.5 kg) 100 lb (45.4 kg)   Height: 5' 2\" (1.575 m) 5' 2\" (1.575 m)                                              BP Readings from Last 3 Encounters:   04/28/22 (!) 152/108   04/18/22 132/84   02/25/22 124/86       NPO Status: Time of last liquid consumption: 2200                        Time of last solid consumption: 2200                        Date of last liquid consumption: 04/27/22                        Date of last solid food consumption: 04/27/22    BMI:   Wt Readings from Last 3 Encounters:   04/28/22 100 lb (45.4 kg)   04/18/22 101 lb 9.6 oz (46.1 kg)   02/25/22 98 lb 6.4 oz (44.6 kg)     Body mass index is 18.29 kg/m².     CBC:   Lab Results   Component Value Date    WBC 10.2 04/28/2022    RBC 4.16 04/28/2022    HGB 13.0 04/28/2022    HCT 39.7 04/28/2022    MCV 95.5 04/28/2022    RDW 13.9 04/28/2022     04/28/2022       CMP:   Lab Results   Component Value Date     04/28/2022    K 4.3 04/28/2022    K 5.0 09/23/2021    K 4.9 09/23/2021    CL 99 04/28/2022    CO2 22 04/28/2022    BUN 10 04/28/2022    CREATININE 0.9 04/28/2022    GFRAA >60 04/28/2022    GFRAA >60 11/02/2011    AGRATIO 1.1 09/23/2021    AGRATIO 1.1 09/23/2021    LABGLOM >60 04/28/2022    GLUCOSE 108 04/28/2022    PROT 6.4 09/23/2021    PROT 6.4 09/23/2021    PROT 7.4 11/02/2011    CALCIUM 10.7 04/28/2022    BILITOT <0.2 09/23/2021    BILITOT <0.2 09/23/2021    ALKPHOS 54 09/23/2021    ALKPHOS 55 09/23/2021    AST 98 09/23/2021    AST 99 09/23/2021    ALT 79 09/23/2021    ALT 79 09/23/2021       POC Tests:   Recent Labs     04/28/22  7067   POCGLU 99       Coags:   Lab Results   Component Value Date    PROTIME 11.5 09/23/2021    INR 1.02 09/23/2021    APTT 34.9 09/23/2021       HCG (If Applicable):   Lab Results   Component Value Date    PREGTESTUR Negative 04/28/2022        ABGs: No results found for: PHART, PO2ART, XRG7JGF, PXT3QFB, BEART, G3ZVLEOH     Type & Screen (If Applicable):  No results found for: LABABO, LABRH    Drug/Infectious Status (If Applicable):  No results found for: HIV, HEPCAB    COVID-19 Screening (If Applicable):   Lab Results   Component Value Date    COVID19 DETECTED 09/22/2021           Anesthesia Evaluation  Patient summary reviewed no history of anesthetic complications:   Airway: Mallampati: II  TM distance: >3 FB   Neck ROM: full  Mouth opening: > = 3 FB Dental:          Pulmonary: breath sounds clear to auscultation  (+) current smoker (1/2-1 ppd)    (-) COPD, asthma, shortness of breath, recent URI and sleep apnea          Patient smoked on day of surgery. Cardiovascular:    (+) hypertension:,     (-) pacemaker, past MI, CAD, CABG/stent, dysrhythmias,  angina and  CHF    ECG reviewed  Rhythm: regular  Rate: normal  Echocardiogram reviewed         Beta Blocker:  Not on Beta Blocker         Neuro/Psych:   (+) neuromuscular disease (generalized ble weakness):,    (-) seizures, TIA and CVA           GI/Hepatic/Renal:        (-) GERD, liver disease, no renal disease, bowel prep and no morbid obesity       Endo/Other:    (+) DiabetesType II DM, , : arthritis:., no malignancy/cancer. (-) hypothyroidism, hyperthyroidism, blood dyscrasia, no malignancy/cancer               Abdominal:             Vascular: negative vascular ROS. Other Findings:             Anesthesia Plan      general     ASA 3       Induction: intravenous. MIPS: Postoperative opioids intended and Prophylactic antiemetics administered. Anesthetic plan and risks discussed with patient. Plan discussed with CRNA. This pre-anesthesia assessment may be used as a history and physical.    DOS STAFF ADDENDUM:    Pt seen and examined, chart reviewed (including anesthesia, drug and allergy history). No interval changes to history and physical examination. Anesthetic plan, risks, benefits, alternatives, and personnel involved discussed with patient. Patient verbalized an understanding and agrees to proceed.       Daniela Cruz MD  April 28, 2022  7:09 AM      Daniela Cruz MD   4/28/2022

## 2022-04-28 NOTE — PROGRESS NOTES
Pt admitted to pacu , having some difficulty  Breathing, anesthesia with pt supporting her chin to open airway.   Pt responded quickly with oxygen suppoert per n/c 4 liters min then quickly decreased

## 2022-04-28 NOTE — OP NOTE
[address]                                OPERATIVE REPORT    PATIENT NAME: Guillermo Alcantar                  :        1980  MED REC NO:   [id number]                          ROOM:  ACCOUNT NO:   [de-identified]                           ADMIT DATE: 2022  PROVIDER:     Refugio Ramesh DO    DATE OF PROCEDURE:  2022    PREOPERATIVE DIAGNOSES:  1. Cervical intraepithelial neoplasia grade 3.  2.  Positive HPV testing. POSTOPERATIVE DIAGNOSES:  1. Cervical intraepithelial neoplasia grade 3.  2.  Positive HPV testing. PROCEDURE PERFORMED:  Loop electrosurgical excisional procedure. SURGEON:  Refugio Ramesh DO    ASSISTANT:  See operative record. ANESTHESIA:  General LMA. IV FLUIDS:  600 mL. URINE OUTPUT:  20 mL. ESTIMATED BLOOD LOSS:  Less than 50 mL. BLOOD TRANSFUSIONS:  None. DRAINS:  None. COMPLICATIONS:  None. CONDITION:  Stable to PACU. SPECIMENS:  1.  Ectocervix tagged at the 12 o'clock position. 2.  Endocervical specimen tagged at the 12 o'clock position. 3.  Endocervical curettage. FINDINGS:  1. Cervix with acetowhite changes at the 10 o'clock and 5 o'clock  positions as noted during colposcopy. 2.  Otherwise normal-appearing external genitalia. INDICATIONS AND CONSENT:  The patient is a 49-year-old female who  presents with Pap smear findings of low-grade squamous intraepithelial  neoplasia with high risk HPV other type positive. The patient returned  for colposcopy on 2022, which showed high-grade squamous  intraepithelial neoplasia OBEY 2 to 3 noted at the 5 o'clock biopsy, biopsies  Were negative 10 o'clock and ECC specimens. The risks, benefits,  and alternatives were reviewed with the patient.   The risks included,  but are not limited to infection, bleeding, injury to the uterus and the  surrounding pelvic structures, risk of anesthesia, and even death. All  questions were answered to the patient's satisfaction. The patient  received clearance from her primary care physician. OPERATIVE PROCEDURE:  The patient was taken to the operating room and  placed on the operating table, where general anesthesia was obtained  without difficulty. The patient was placed in the dorsal lithotomy  position using the Yellofins stirrups. The patient was prepped and  draped in the usual sterile fashion. A universal time-out was performed  prior to the start of the procedure. Coated speculum was placed in the patient's vagina with adequate  visualization of the cervix. Acetic solution was placed on the cervix  with findings as noted above. The cervix was infiltrated with a  solution of 1% lidocaine with epinephrine into the cervical stroma. A  20 mm x 12 mm loop electrode was utilized to obtain anterior and  posterior ectocervical specimen. This was tagged at the 12 o'clock  position, handed off to be sent to Pathology. A 10 x 10 loop electrode  was then utilized to obtain an endocervical specimen, which was also  tagged at the 12 o'clock position and handed off to be sent to  Pathology. Post procedure ECC was then performed using endocervical  curette and cytobrush. These were both handed off to be sent to  Pathology. The LEEP bed was then cauterized extensively using Bovie  cautery until excellent hemostasis was noted. Monsel solution was  placed on the LEEP bed as well. After a period of observation,  excellent hemostasis was maintained. All instruments were removed from  the patient's vagina. All sponge, lap, and needle counts were noted to be correct. The  patient was awoken from general anesthesia without difficulty and taken  to the postanesthesia care unit in stable condition after tolerating the  procedure well.         Bawte, DO    D: 04/28/2022 7:54:14       T: 04/28/2022 9:11:39     [id number]  Job#: [id number] Doc#: [id number]    CC:

## 2022-05-02 ENCOUNTER — TELEPHONE (OUTPATIENT)
Dept: OBGYN CLINIC | Age: 42
End: 2022-05-02

## 2022-05-02 NOTE — TELEPHONE ENCOUNTER
Yes, the more patient is up and lifting or moving the more bleeding she will have. Please have patient monitor bleeding today and if bleeding through a pad an hour call on call physician. Thank you.

## 2022-05-02 NOTE — TELEPHONE ENCOUNTER
Pt states she had a LEEP procedure on Thursday. She says she went back to work today. She had only been wearing a panty liner and started bleeding. It did soak through the liner. She placed a pad but this just happened so she has not checked yet to change it. She says she has only mild cramping. She states she did walk and is wondering if she over did it. Bleeding precautions given. Post op appointment scheduled on 5/13/22. Please advise.

## 2022-05-13 ENCOUNTER — OFFICE VISIT (OUTPATIENT)
Dept: OBGYN CLINIC | Age: 42
End: 2022-05-13

## 2022-05-13 DIAGNOSIS — Z98.890 S/P LEEP (LOOP ELECTROSURGICAL EXCISION PROCEDURE): ICD-10-CM

## 2022-05-13 DIAGNOSIS — Z09 POSTOPERATIVE EXAMINATION: Primary | ICD-10-CM

## 2022-05-13 DIAGNOSIS — N89.8 VAGINAL DISCHARGE: ICD-10-CM

## 2022-05-13 PROCEDURE — 99024 POSTOP FOLLOW-UP VISIT: CPT | Performed by: OBSTETRICS & GYNECOLOGY

## 2022-05-13 NOTE — PROGRESS NOTES
Return Gyn Office Visit    CC:   Chief Complaint   Patient presents with    Post-Op Check     LEEP       HPI:  Scar Finch is a 39 y.o. female who presents for post-op check after LEEP. Patient is seen and examined today. Patient is doing well today. Reports last week she woke up to go to the bathroom and had significant pelvic cramping. States the black discharge has stopped. Has not had any bleeding. Denies concerns with bowel or bladder function. Denies chest pain, shortness of breath, fever, chills, nausea, vomiting. Review of Systems - The following ROS was otherwise negative, except as noted in the HPI: constitutional, respiratory, cardiovascular, gastrointestinal, genitourinary    Objective:  LMP 04/20/2022     Physical Exam  Vitals reviewed. Exam conducted with a chaperone present. Constitutional:       General: She is not in acute distress. Appearance: She is well-developed. HENT:      Head: Normocephalic and atraumatic. Eyes:      Conjunctiva/sclera: Conjunctivae normal.   Cardiovascular:      Rate and Rhythm: Normal rate. Pulmonary:      Effort: Pulmonary effort is normal. No respiratory distress. Abdominal:      General: There is no distension. Palpations: Abdomen is soft. Tenderness: There is no abdominal tenderness. There is no guarding or rebound. Genitourinary:     Vagina: Vaginal discharge present. Comments: LEEP bed healing well. Scant vaginal discharge. Musculoskeletal:         General: No swelling. Skin:     General: Skin is warm and dry. Neurological:      Mental Status: She is alert and oriented to person, place, and time. Psychiatric:         Mood and Affect: Mood normal.         Behavior: Behavior normal.         Thought Content: Thought content normal.         Assessment/Plan:     Scar Finch is a 39 y.o. female who presents for post-op check after LEEP.     1. Postoperative examination     - Patient is doing well with only minimal discharge     - Meeting milestones     - Surgical pathology reviewed with patient     - With negative margins will proceed with co-test in 1 year     - Post-operative instructions and precautions reviewed     - Okay to resume regular activity      - Return precautions reviewed     - Will follow-up in 1 year for annual exam     2. Vaginal discharge     - Scant discharge on exam     - Likely physiologic and healing, however will rule out infectious etiology    - VAGINAL PATHOGENS PROBE *A     - Will call with and treat based on results     3.  S/P LEEP (loop electrosurgical excision procedure)     - See above       Ulysses Pacer, DO

## 2022-05-14 LAB
CANDIDA SPECIES, DNA PROBE: ABNORMAL
GARDNERELLA VAGINALIS, DNA PROBE: ABNORMAL
TRICHOMONAS VAGINALIS DNA: ABNORMAL

## 2022-05-15 RX ORDER — METRONIDAZOLE 500 MG/1
500 TABLET ORAL 2 TIMES DAILY
Qty: 14 TABLET | Refills: 0 | Status: SHIPPED | OUTPATIENT
Start: 2022-05-15 | End: 2022-05-22

## 2022-05-31 ENCOUNTER — APPOINTMENT (OUTPATIENT)
Dept: CT IMAGING | Age: 42
End: 2022-05-31
Payer: COMMERCIAL

## 2022-05-31 ENCOUNTER — HOSPITAL ENCOUNTER (EMERGENCY)
Age: 42
Discharge: HOME | End: 2022-05-31
Attending: STUDENT IN AN ORGANIZED HEALTH CARE EDUCATION/TRAINING PROGRAM
Payer: COMMERCIAL

## 2022-05-31 ENCOUNTER — APPOINTMENT (OUTPATIENT)
Dept: GENERAL RADIOLOGY | Age: 42
End: 2022-05-31
Payer: COMMERCIAL

## 2022-05-31 VITALS
TEMPERATURE: 97.8 F | OXYGEN SATURATION: 100 % | RESPIRATION RATE: 16 BRPM | WEIGHT: 103 LBS | HEIGHT: 62 IN | HEART RATE: 98 BPM | DIASTOLIC BLOOD PRESSURE: 99 MMHG | SYSTOLIC BLOOD PRESSURE: 151 MMHG | BODY MASS INDEX: 18.95 KG/M2

## 2022-05-31 DIAGNOSIS — M79.605 BILATERAL LEG PAIN: Primary | ICD-10-CM

## 2022-05-31 DIAGNOSIS — M79.604 BILATERAL LEG PAIN: Primary | ICD-10-CM

## 2022-05-31 LAB
A/G RATIO: 1.7 (ref 1.1–2.2)
ALBUMIN SERPL-MCNC: 4.8 G/DL (ref 3.4–5)
ALP BLD-CCNC: 47 U/L (ref 40–129)
ALT SERPL-CCNC: 14 U/L (ref 10–40)
AMPHETAMINE SCREEN, URINE: NORMAL
ANION GAP SERPL CALCULATED.3IONS-SCNC: 11 MMOL/L (ref 3–16)
AST SERPL-CCNC: 19 U/L (ref 15–37)
BACTERIA: ABNORMAL /HPF
BARBITURATE SCREEN URINE: NORMAL
BASOPHILS ABSOLUTE: 0.1 K/UL (ref 0–0.2)
BASOPHILS RELATIVE PERCENT: 0.9 %
BENZODIAZEPINE SCREEN, URINE: NORMAL
BILIRUB SERPL-MCNC: <0.2 MG/DL (ref 0–1)
BILIRUBIN URINE: NEGATIVE
BLOOD, URINE: NEGATIVE
BUN BLDV-MCNC: 10 MG/DL (ref 7–20)
CALCIUM SERPL-MCNC: 9.6 MG/DL (ref 8.3–10.6)
CANNABINOID SCREEN URINE: NORMAL
CHLORIDE BLD-SCNC: 96 MMOL/L (ref 99–110)
CLARITY: CLEAR
CO2: 24 MMOL/L (ref 21–32)
COCAINE METABOLITE SCREEN URINE: NORMAL
COLOR: YELLOW
CREAT SERPL-MCNC: 0.7 MG/DL (ref 0.6–1.1)
D DIMER: 0.47 UG/ML FEU (ref 0–0.6)
EOSINOPHILS ABSOLUTE: 0.1 K/UL (ref 0–0.6)
EOSINOPHILS RELATIVE PERCENT: 1.9 %
EPITHELIAL CELLS, UA: ABNORMAL /HPF (ref 0–5)
ETHANOL: NORMAL MG/DL (ref 0–0.08)
GFR AFRICAN AMERICAN: >60
GFR NON-AFRICAN AMERICAN: >60
GLUCOSE BLD-MCNC: 98 MG/DL (ref 70–99)
GLUCOSE URINE: NEGATIVE MG/DL
HCT VFR BLD CALC: 39.5 % (ref 36–48)
HEMOGLOBIN: 13.3 G/DL (ref 12–16)
KETONES, URINE: NEGATIVE MG/DL
LEUKOCYTE ESTERASE, URINE: NEGATIVE
LYMPHOCYTES ABSOLUTE: 2.2 K/UL (ref 1–5.1)
LYMPHOCYTES RELATIVE PERCENT: 27 %
Lab: NORMAL
MCH RBC QN AUTO: 31 PG (ref 26–34)
MCHC RBC AUTO-ENTMCNC: 33.6 G/DL (ref 31–36)
MCV RBC AUTO: 92.3 FL (ref 80–100)
METHADONE SCREEN, URINE: NORMAL
MICROSCOPIC EXAMINATION: YES
MONOCYTES ABSOLUTE: 0.5 K/UL (ref 0–1.3)
MONOCYTES RELATIVE PERCENT: 6.1 %
NEUTROPHILS ABSOLUTE: 5.1 K/UL (ref 1.7–7.7)
NEUTROPHILS RELATIVE PERCENT: 64.1 %
NITRITE, URINE: NEGATIVE
OPIATE SCREEN URINE: NORMAL
OXYCODONE URINE: NORMAL
PDW BLD-RTO: 13.4 % (ref 12.4–15.4)
PH UA: 6
PH UA: 6 (ref 5–8)
PHENCYCLIDINE SCREEN URINE: NORMAL
PLATELET # BLD: 296 K/UL (ref 135–450)
PMV BLD AUTO: 8.6 FL (ref 5–10.5)
POTASSIUM REFLEX MAGNESIUM: 4.4 MMOL/L (ref 3.5–5.1)
PRO-BNP: 2203 PG/ML (ref 0–124)
PROPOXYPHENE SCREEN: NORMAL
PROTEIN UA: 30 MG/DL
RBC # BLD: 4.27 M/UL (ref 4–5.2)
RBC UA: ABNORMAL /HPF (ref 0–4)
SODIUM BLD-SCNC: 131 MMOL/L (ref 136–145)
SPECIFIC GRAVITY UA: <=1.005 (ref 1–1.03)
TOTAL CK: 86 U/L (ref 26–192)
TOTAL PROTEIN: 7.7 G/DL (ref 6.4–8.2)
TSH REFLEX: 0.86 UIU/ML (ref 0.27–4.2)
URINE REFLEX TO CULTURE: ABNORMAL
URINE TYPE: ABNORMAL
UROBILINOGEN, URINE: 0.2 E.U./DL
WBC # BLD: 7.9 K/UL (ref 4–11)
WBC UA: ABNORMAL /HPF (ref 0–5)

## 2022-05-31 PROCEDURE — 96374 THER/PROPH/DIAG INJ IV PUSH: CPT

## 2022-05-31 PROCEDURE — 70450 CT HEAD/BRAIN W/O DYE: CPT

## 2022-05-31 PROCEDURE — 81001 URINALYSIS AUTO W/SCOPE: CPT

## 2022-05-31 PROCEDURE — 6360000002 HC RX W HCPCS: Performed by: STUDENT IN AN ORGANIZED HEALTH CARE EDUCATION/TRAINING PROGRAM

## 2022-05-31 PROCEDURE — 82077 ASSAY SPEC XCP UR&BREATH IA: CPT

## 2022-05-31 PROCEDURE — 72100 X-RAY EXAM L-S SPINE 2/3 VWS: CPT

## 2022-05-31 PROCEDURE — 80053 COMPREHEN METABOLIC PANEL: CPT

## 2022-05-31 PROCEDURE — 85025 COMPLETE CBC W/AUTO DIFF WBC: CPT

## 2022-05-31 PROCEDURE — 84443 ASSAY THYROID STIM HORMONE: CPT

## 2022-05-31 PROCEDURE — 85379 FIBRIN DEGRADATION QUANT: CPT

## 2022-05-31 PROCEDURE — 36415 COLL VENOUS BLD VENIPUNCTURE: CPT

## 2022-05-31 PROCEDURE — 83880 ASSAY OF NATRIURETIC PEPTIDE: CPT

## 2022-05-31 PROCEDURE — 99284 EMERGENCY DEPT VISIT MOD MDM: CPT

## 2022-05-31 PROCEDURE — 71045 X-RAY EXAM CHEST 1 VIEW: CPT

## 2022-05-31 PROCEDURE — 82550 ASSAY OF CK (CPK): CPT

## 2022-05-31 PROCEDURE — 80307 DRUG TEST PRSMV CHEM ANLYZR: CPT

## 2022-05-31 RX ORDER — ONDANSETRON 2 MG/ML
4 INJECTION INTRAMUSCULAR; INTRAVENOUS EVERY 6 HOURS PRN
Status: DISCONTINUED | OUTPATIENT
Start: 2022-05-31 | End: 2022-05-31 | Stop reason: HOSPADM

## 2022-05-31 RX ADMIN — ONDANSETRON HYDROCHLORIDE 4 MG: 2 INJECTION, SOLUTION INTRAMUSCULAR; INTRAVENOUS at 14:32

## 2022-05-31 ASSESSMENT — ENCOUNTER SYMPTOMS
SORE THROAT: 0
ABDOMINAL PAIN: 0
RHINORRHEA: 0
PHOTOPHOBIA: 0
COUGH: 0
VOMITING: 0
BACK PAIN: 0
NAUSEA: 0
SHORTNESS OF BREATH: 0

## 2022-05-31 ASSESSMENT — PAIN DESCRIPTION - PAIN TYPE: TYPE: ACUTE PAIN

## 2022-05-31 ASSESSMENT — PAIN DESCRIPTION - DESCRIPTORS: DESCRIPTORS: SHARP

## 2022-05-31 ASSESSMENT — PAIN SCALES - GENERAL: PAINLEVEL_OUTOF10: 8

## 2022-05-31 ASSESSMENT — PAIN - FUNCTIONAL ASSESSMENT: PAIN_FUNCTIONAL_ASSESSMENT: 0-10

## 2022-05-31 ASSESSMENT — PAIN DESCRIPTION - LOCATION: LOCATION: BACK

## 2022-05-31 ASSESSMENT — PAIN DESCRIPTION - ORIENTATION: ORIENTATION: LOWER

## 2022-05-31 NOTE — Clinical Note
Ed Hardy was seen and treated in our emergency department on 5/31/2022. She may return to work on 06/02/2022. If you have any questions or concerns, please don't hesitate to call.       Ever Arora, DO

## 2022-05-31 NOTE — ED PROVIDER NOTES
[address] ED  EMERGENCY DEPARTMENT ENCOUNTER      Pt Name: Rossi Flores  MRN: [id number]  [place] 1980  Date of evaluation: 5/31/2022  Provider[facility]       Chief Complaint   Patient presents with    Leg Pain     EMS states pt c/o bilat upper leg pain for several months, pt also states lower back pain today, staff at work stated pt doesn't seem normal, dizziness, given 4mg zofran         HISTORY OF PRESENT ILLNESS   (Location/Symptom, Timing/Onset, Context/Setting, Quality, Duration, Modifying Factors, Severity)  Note limiting factors. Mayte Graves is a 39 y.o. female who presents to the emergency department complaining of chief complaint is bilateral thigh and calf pain. Symptoms have been ongoing for several months, currently being worked up by her PCP. Has a follow-up tomorrow with vascular team.  Patient does describe worsening of pain with ambulation. Also complaining of low back pain today. No reported trauma. Denies history of saddle anesthesia, incontinence, urinary retention, history of IVDA, numbness or weakness to lower extremities. States that her gait is sometimes impaired due to pain in the legs. Denies abdominal pain. Patient also reports episode today of lightheadedness. No room spinning sensation. No headache no neck pain no chest pain no palpitations no shortness of breath. Patient did not pass out. There was some concern that the patient was not acting appropriately however at time of arrival father at bedside reports that patient is at baseline. Nursing Notes were reviewed.     PAST MEDICAL HISTORY     Past Medical History:   Diagnosis Date    Abnormal Pap smear of cervix     [facility] [address]     COVID-19 09/22/2021    Diabetes mellitus ([facility] [address]     Endometriosis     Hyperlipidemia     Hypertension     Interstitial cystitis     past hx         SURGICAL HISTORY       Past Surgical History:   Procedure Laterality Date    BREAST SURGERY Bilateral 2012    implants    CHOLECYSTECTOMY      ENDOMETRIAL ABLATION      LEEP N/A 4/28/2022    LOOP ELECTROSURGICAL EXCISION PROCEDURE performed by Lencho Iglesias DO at [address] [place] [address]  3/22/11    irrigation and debridement of right vulva         CURRENT MEDICATIONS       Discharge Medication List as of 5/31/2022  4:10 PM      CONTINUE these medications which have NOT CHANGED    Details   ibuprofen (ADVIL;MOTRIN) 600 MG tablet Take 1 tablet by mouth every 8 hours as needed for Pain, Disp-60 tablet, R-0Print      Cyanocobalamin (VITAMIN B-12 PO) Take by mouth every other dayHistorical Med      zinc sulfate (ZINCATE) 220 (50 Zn) MG capsule Take 50 mg by mouth dailyHistorical Med      Multiple Vitamins-Minerals (WOMENS MULTIVITAMIN PO) Take by mouth dailyHistorical Med      metformin (GLUCOPHAGE-XR) 500 MG XR tablet Take 500-1,000 mg by mouth in the morning and at bedtime Takes 500 mg in am and 1000 mg in eveningHistorical Med      lisinopril (PRINIVIL;ZESTRIL) 10 MG tablet Take 10 mg by mouth daily. lovastatin (MEVACOR) 20 MG tablet Take 20 mg by mouth daily.              ALLERGIES     Codeine, Gabapentin, and Guaifenesin er    FAMILY HISTORY       Family History   Problem Relation Age of Onset    Birth Defects Father     High Blood Pressure Father     High Cholesterol Father     Asthma Sister     Depression Sister     Arthritis Paternal Aunt     Diabetes Paternal Aunt     Heart Disease Paternal Aunt     High Blood Pressure Paternal Aunt     Kidney Disease Paternal Aunt     Stroke Paternal Aunt     Cancer Paternal Grandmother     Diabetes Paternal Grandmother     High Blood Pressure Paternal Grandmother     High Cholesterol Paternal Grandmother     Kidney Disease Paternal Grandmother     Cancer Paternal Grandfather     Diabetes Paternal Grandfather     Heart Disease Paternal Grandfather     High Blood Pressure Paternal Grandfather     High Cholesterol Paternal Grandfather           SOCIAL HISTORY       Social History     Socioeconomic History    Marital status: Single     Spouse name: None    Number of children: None    Years of education: None    Highest education level: None   Occupational History    None   Tobacco Use    Smoking status: Current Every Day Smoker     Packs/day: 0.50     Years: 14.00     Pack years: 7.00     Types: Cigarettes    Smokeless tobacco: Never Used   Vaping Use    Vaping Use: Never used   Substance and Sexual Activity    Alcohol use: Yes     Comment: 3 days a week    Drug use: No    Sexual activity: Yes     Partners: Male     Birth control/protection: None   Other Topics Concern    None   Social History Narrative    None     Social Determinants of Health     Financial Resource Strain:     Difficulty of Paying Living Expenses: Not on file   Food Insecurity:     Worried About Running Out of Food in the Last Year: Not on file    Rose of Food in the Last Year: Not on file   Transportation Needs:     Lack of Transportation (Medical): Not on file    Lack of Transportation (Non-Medical):  Not on file   Physical Activity:     Days of Exercise per Week: Not on file    Minutes of Exercise per Session: Not on file   Stress:     Feeling of Stress : Not on file   Social Connections:     Frequency of Communication with Friends and Family: Not on file    Frequency of Social Gatherings with Friends and Family: Not on file    Attends Mormonism Services: Not on file    Active Member of Clubs or Organizations: Not on file    Attends Club or Organization Meetings: Not on file    Marital Status: Not on file   Intimate Partner Violence:     Fear of Current or Ex-Partner: Not on file    Emotionally Abused: Not on file    Physically Abused: Not on file    Sexually Abused: Not on file   Housing Stability:     Unable to Pay for Housing in the Last Year: Not on file    Number of Places Lived in the Last Year: Not on file    Unstable Housing in the Last Year: Not on file       SCREENINGS        [place] Coma Scale  Eye Opening: Spontaneous  Best Verbal Response: Oriented  Best Motor Response: Obeys commands  Fabio Coma Scale Score: 15                   REVIEW OF SYSTEMS    (2-9 systems for level 4, 10 or more for level 5)   Review of Systems   Constitutional: Negative for chills and fever. HENT: Negative for congestion, rhinorrhea and sore throat. Eyes: Negative for photophobia and visual disturbance. Respiratory: Negative for cough and shortness of breath. Cardiovascular: Negative for chest pain, palpitations and leg swelling. Gastrointestinal: Negative for abdominal pain, nausea and vomiting. Genitourinary: Negative for decreased urine volume. Musculoskeletal: Positive for myalgias. Negative for back pain, neck pain and neck stiffness. Skin: Negative for rash. Allergic/Immunologic: Negative for environmental allergies. Neurological: Positive for light-headedness. Hematological: Negative for adenopathy. Psychiatric/Behavioral: Negative for confusion. PHYSICAL EXAM    (up to 7 for level 4, 8 or more for level 5)   RECENT VITALS:     Temp: 97.8 °F (36.6 °C),  Heart Rate: 98, Resp: 16, BP: (!) 151/99, SpO2: 100 %    Physical Exam  Constitutional:       General: She is not in acute distress. Appearance: She is not diaphoretic. HENT:      Head: Normocephalic and atraumatic. Eyes:      Pupils: Pupils are equal, round, and reactive to light. Neck:      Trachea: No tracheal deviation. Cardiovascular:      Rate and Rhythm: Normal rate and regular rhythm. Pulses: Normal pulses. Pulmonary:      Effort: Pulmonary effort is normal. No respiratory distress. Abdominal:      General: There is no distension. Palpations: Abdomen is soft. Musculoskeletal:         General: Normal range of motion.       Cervical back: Normal range of motion and neck supple. Comments: Bilateral lower extremity: Compartments are soft and compressible. No overlying skin change no crepitus. Distal pulses are palpable. Cap refill less than 2 seconds. Normal color. Skin:     General: Skin is warm. Neurological:      Mental Status: She is oriented to person, place, and time. DIAGNOSTIC RESULTS       RADIOLOGY:   Non-plain film images such as CT, Ultrasound and MRI are read by the radiologist. Plain radiographic images are visualized and preliminarily interpreted by the emergency physician. Interpretation per the Radiologist below, if available at the time of this note:    CT HEAD WO CONTRAST   Final Result   No acute intracranial abnormality. Moderate cerebral and cerebellar atrophy   noted. XR LUMBAR SPINE (2-3 VIEWS)   Final Result   No acute fracture or subluxation. XR CHEST PORTABLE   Final Result   No acute cardiopulmonary disease. LABS:  Labs Reviewed   URINALYSIS WITH REFLEX TO CULTURE - Abnormal; Notable for the following components:       Result Value    Protein, UA 30 (*)     All other components within normal limits   MICROSCOPIC URINALYSIS - Abnormal; Notable for the following components:    Bacteria, UA Rare (*)     All other components within normal limits   COMPREHENSIVE METABOLIC PANEL W/ REFLEX TO MG FOR LOW K - Abnormal; Notable for the following components:    Sodium 131 (*)     Chloride 96 (*)     All other components within normal limits   BRAIN NATRIURETIC PEPTIDE - Abnormal; Notable for the following components:    Pro-BNP 2,203 (*)     All other components within normal limits   COVID-19 & INFLUENZA COMBO   URINE DRUG SCREEN   CBC WITH AUTO DIFFERENTIAL   ETHANOL   TSH WITH REFLEX   CK   D-DIMER, QUANTITATIVE       All other labs were within normal range or not returned as of this dictation.     EMERGENCY DEPARTMENT COURSE and DIFFERENTIAL DIAGNOSIS/MDM:   Delaney Holloway is a 39 y.o. female who presents to the emergency department with the complaint of bilateral leg pain. Acute on chronic. Unclear etiology. Patient's lower extremities were evaluated, compartments are soft and compressible. She has strong distal pulses. Color normal.  No overlying skin change. Symmetric strength and sensation. Is complaining of low back pain today, no reported trauma. CK within normal limits. Patient has upcoming visit tomorrow with vascular team for further evaluation of leg pain as his description is potentially claudication as it seems to worsen with ambulation improves with rest.  Lower suspicion for epidural abscess or hematoma. Not on anticoagulation. No history of IVDA. No fevers, no pinpoint tenderness midline spine. Pain is across the low low back. Denies urinary complaints dysuria hematuria no abdominal pain no pulsatile mass. Lower clinical suspicion for AAA, or dissection. Vitals are stable on arrival excluding mild hypertension with systolics in the 594Y. No headaches, no midline neck pain. There was some reported that patient was not acting appropriate however father at bedside report that this is patient's baseline. CT was obtained due to this and was found to be negative for any acute findings. She has no focal deficits. Labs reviewed patient's BNP is elevated however stable, there is no signs of overt heart failure, no lower extremity edema, no rales, x-ray without signs of acute cardiopulmonary findings. CRITICAL CARE TIME   Total Critical Care time was 0 minutes, excluding separately reportable procedures. There was a high probability of clinically significant/life threatening deterioration in the patient's condition which required my urgent intervention. Clinical concern   Intervention     CONSULTS:  None    PROCEDURES:  Unless otherwise noted below, none     Procedures        FINAL IMPRESSION      1.  Bilateral leg pain          DISPOSITION/PLAN   DISPOSITION Decision To Discharge 05/31/2022 03:55:41 PM      [facility]  [place] [facility]  [address] [facility]  [phone]    If symptoms worsen      DISCHARGE MEDICATIONS:  Discharge Medication List as of 5/31/2022  4:10 PM        Controlled Substances Monitoring:     No flowsheet data found.     (Please note that portions of this note were completed with a voice recognition program.  Efforts were made to edit the dictations but occasionally words are mis-transcribed.)    Teresa Nieto DO (electronically signed)  Attending Emergency Physician            Teresa Nieto DO  05/31/22 1730

## 2023-04-04 ENCOUNTER — TELEPHONE (OUTPATIENT)
Dept: OBGYN CLINIC | Age: 43
End: 2023-04-04

## 2023-04-04 RX ORDER — METRONIDAZOLE 500 MG/1
500 TABLET ORAL 2 TIMES DAILY
Qty: 14 TABLET | Refills: 0 | Status: SHIPPED | OUTPATIENT
Start: 2023-04-04 | End: 2023-04-11

## 2023-04-04 NOTE — TELEPHONE ENCOUNTER
Pt calling with concern for BV. She says she had been taking bubble baths. Reports for the past 4 days vaginal irritation, odor, burning. No discharge. Last pap 2/22 scheduled for June for annual. Pended medication. Please sign or advise.

## 2023-04-04 NOTE — TELEPHONE ENCOUNTER
Pt was called and is aware of Physician response. Pt will call if no improvement after medication.  DONE

## 2023-04-10 ENCOUNTER — HOSPITAL ENCOUNTER (EMERGENCY)
Age: 43
Discharge: LEFT BEFORE DISCHARGE | DRG: 291 | End: 2023-04-10
Attending: STUDENT IN AN ORGANIZED HEALTH CARE EDUCATION/TRAINING PROGRAM | Admitting: INTERNAL MEDICINE
Payer: COMMERCIAL

## 2023-04-10 ENCOUNTER — APPOINTMENT (OUTPATIENT)
Dept: GENERAL RADIOLOGY | Age: 43
DRG: 291 | End: 2023-04-10
Payer: COMMERCIAL

## 2023-04-10 VITALS
BODY MASS INDEX: 18.03 KG/M2 | SYSTOLIC BLOOD PRESSURE: 158 MMHG | TEMPERATURE: 98.1 F | OXYGEN SATURATION: 99 % | HEART RATE: 87 BPM | WEIGHT: 98 LBS | RESPIRATION RATE: 16 BRPM | HEIGHT: 62 IN | DIASTOLIC BLOOD PRESSURE: 115 MMHG

## 2023-04-10 DIAGNOSIS — R55 NEAR SYNCOPE: Primary | ICD-10-CM

## 2023-04-10 DIAGNOSIS — I10 UNCONTROLLED HYPERTENSION: ICD-10-CM

## 2023-04-10 DIAGNOSIS — R79.89 ELEVATED BRAIN NATRIURETIC PEPTIDE (BNP) LEVEL: ICD-10-CM

## 2023-04-10 DIAGNOSIS — G89.29 CHRONIC PAIN OF LOWER EXTREMITY, BILATERAL: ICD-10-CM

## 2023-04-10 DIAGNOSIS — R77.8 ELEVATED TROPONIN: ICD-10-CM

## 2023-04-10 DIAGNOSIS — M79.604 CHRONIC PAIN OF LOWER EXTREMITY, BILATERAL: ICD-10-CM

## 2023-04-10 DIAGNOSIS — M79.605 CHRONIC PAIN OF LOWER EXTREMITY, BILATERAL: ICD-10-CM

## 2023-04-10 PROBLEM — I50.23 ACUTE ON CHRONIC SYSTOLIC HEART FAILURE (HCC): Status: ACTIVE | Noted: 2023-04-10

## 2023-04-10 LAB
ALBUMIN SERPL-MCNC: 3.7 G/DL (ref 3.4–5)
ALBUMIN/GLOB SERPL: 1.2 {RATIO} (ref 1.1–2.2)
ALP SERPL-CCNC: 47 U/L (ref 40–129)
ALT SERPL-CCNC: 20 U/L (ref 10–40)
ANION GAP SERPL CALCULATED.3IONS-SCNC: 11 MMOL/L (ref 3–16)
AST SERPL-CCNC: 23 U/L (ref 15–37)
BASOPHILS # BLD: 0.1 K/UL (ref 0–0.2)
BASOPHILS NFR BLD: 1 %
BILIRUB SERPL-MCNC: <0.2 MG/DL (ref 0–1)
BILIRUB UR QL STRIP.AUTO: NEGATIVE
BUN SERPL-MCNC: 9 MG/DL (ref 7–20)
CALCIUM SERPL-MCNC: 9 MG/DL (ref 8.3–10.6)
CHLORIDE SERPL-SCNC: 92 MMOL/L (ref 99–110)
CK SERPL-CCNC: 100 U/L (ref 26–192)
CLARITY UR: CLEAR
CO2 SERPL-SCNC: 25 MMOL/L (ref 21–32)
COLOR UR: YELLOW
CREAT SERPL-MCNC: 0.8 MG/DL (ref 0.6–1.1)
DEPRECATED RDW RBC AUTO: 14.6 % (ref 12.4–15.4)
EKG ATRIAL RATE: 99 BPM
EKG DIAGNOSIS: NORMAL
EKG P AXIS: 75 DEGREES
EKG P-R INTERVAL: 114 MS
EKG Q-T INTERVAL: 332 MS
EKG QRS DURATION: 76 MS
EKG QTC CALCULATION (BAZETT): 426 MS
EKG R AXIS: 37 DEGREES
EKG T AXIS: -85 DEGREES
EKG VENTRICULAR RATE: 99 BPM
EOSINOPHIL # BLD: 0 K/UL (ref 0–0.6)
EOSINOPHIL NFR BLD: 0.5 %
EPI CELLS #/AREA URNS HPF: NORMAL /HPF (ref 0–5)
GFR SERPLBLD CREATININE-BSD FMLA CKD-EPI: >60 ML/MIN/{1.73_M2}
GLUCOSE SERPL-MCNC: 132 MG/DL (ref 70–99)
GLUCOSE UR STRIP.AUTO-MCNC: NEGATIVE MG/DL
HCG SERPL QL: NEGATIVE
HCT VFR BLD AUTO: 38.4 % (ref 36–48)
HGB BLD-MCNC: 12.8 G/DL (ref 12–16)
HGB UR QL STRIP.AUTO: NEGATIVE
KETONES UR STRIP.AUTO-MCNC: NEGATIVE MG/DL
LEUKOCYTE ESTERASE UR QL STRIP.AUTO: NEGATIVE
LYMPHOCYTES # BLD: 1.6 K/UL (ref 1–5.1)
LYMPHOCYTES NFR BLD: 22.2 %
MCH RBC QN AUTO: 30.6 PG (ref 26–34)
MCHC RBC AUTO-ENTMCNC: 33.2 G/DL (ref 31–36)
MCV RBC AUTO: 92.2 FL (ref 80–100)
MONOCYTES # BLD: 0.3 K/UL (ref 0–1.3)
MONOCYTES NFR BLD: 4.4 %
NEUTROPHILS # BLD: 5.1 K/UL (ref 1.7–7.7)
NEUTROPHILS NFR BLD: 71.9 %
NITRITE UR QL STRIP.AUTO: NEGATIVE
NT-PROBNP SERPL-MCNC: 5568 PG/ML (ref 0–124)
PH UR STRIP.AUTO: 7 [PH] (ref 5–8)
PLATELET # BLD AUTO: 326 K/UL (ref 135–450)
PMV BLD AUTO: 8 FL (ref 5–10.5)
POTASSIUM SERPL-SCNC: 4.2 MMOL/L (ref 3.5–5.1)
PROT SERPL-MCNC: 6.9 G/DL (ref 6.4–8.2)
PROT UR STRIP.AUTO-MCNC: 100 MG/DL
RBC # BLD AUTO: 4.17 M/UL (ref 4–5.2)
RBC #/AREA URNS HPF: NORMAL /HPF (ref 0–4)
SODIUM SERPL-SCNC: 128 MMOL/L (ref 136–145)
SP GR UR STRIP.AUTO: 1.01 (ref 1–1.03)
TROPONIN T SERPL-MCNC: 0.03 NG/ML
UA COMPLETE W REFLEX CULTURE PNL UR: ABNORMAL
UA DIPSTICK W REFLEX MICRO PNL UR: YES
URN SPEC COLLECT METH UR: ABNORMAL
UROBILINOGEN UR STRIP-ACNC: 0.2 E.U./DL
WBC # BLD AUTO: 7.1 K/UL (ref 4–11)
WBC #/AREA URNS HPF: NORMAL /HPF (ref 0–5)

## 2023-04-10 PROCEDURE — 71045 X-RAY EXAM CHEST 1 VIEW: CPT

## 2023-04-10 PROCEDURE — 96360 HYDRATION IV INFUSION INIT: CPT

## 2023-04-10 PROCEDURE — 99285 EMERGENCY DEPT VISIT HI MDM: CPT

## 2023-04-10 PROCEDURE — 93010 ELECTROCARDIOGRAM REPORT: CPT | Performed by: INTERNAL MEDICINE

## 2023-04-10 PROCEDURE — 81001 URINALYSIS AUTO W/SCOPE: CPT

## 2023-04-10 PROCEDURE — 83880 ASSAY OF NATRIURETIC PEPTIDE: CPT

## 2023-04-10 PROCEDURE — 93005 ELECTROCARDIOGRAM TRACING: CPT | Performed by: PHYSICIAN ASSISTANT

## 2023-04-10 PROCEDURE — 80053 COMPREHEN METABOLIC PANEL: CPT

## 2023-04-10 PROCEDURE — 2580000003 HC RX 258: Performed by: PHYSICIAN ASSISTANT

## 2023-04-10 PROCEDURE — 82550 ASSAY OF CK (CPK): CPT

## 2023-04-10 PROCEDURE — 1200000000 HC SEMI PRIVATE

## 2023-04-10 PROCEDURE — 85025 COMPLETE CBC W/AUTO DIFF WBC: CPT

## 2023-04-10 PROCEDURE — 84703 CHORIONIC GONADOTROPIN ASSAY: CPT

## 2023-04-10 PROCEDURE — 84484 ASSAY OF TROPONIN QUANT: CPT

## 2023-04-10 RX ORDER — LISINOPRIL 20 MG/1
20 TABLET ORAL DAILY
Status: CANCELLED | OUTPATIENT
Start: 2023-04-10

## 2023-04-10 RX ORDER — POTASSIUM CHLORIDE 20 MEQ/1
40 TABLET, EXTENDED RELEASE ORAL PRN
Status: CANCELLED | OUTPATIENT
Start: 2023-04-10

## 2023-04-10 RX ORDER — SODIUM CHLORIDE 0.9 % (FLUSH) 0.9 %
5-40 SYRINGE (ML) INJECTION PRN
Status: CANCELLED | OUTPATIENT
Start: 2023-04-10

## 2023-04-10 RX ORDER — SODIUM CHLORIDE 0.9 % (FLUSH) 0.9 %
5-40 SYRINGE (ML) INJECTION EVERY 12 HOURS SCHEDULED
Status: CANCELLED | OUTPATIENT
Start: 2023-04-10

## 2023-04-10 RX ORDER — MAGNESIUM SULFATE IN WATER 40 MG/ML
2000 INJECTION, SOLUTION INTRAVENOUS PRN
Status: CANCELLED | OUTPATIENT
Start: 2023-04-10

## 2023-04-10 RX ORDER — SODIUM CHLORIDE 9 MG/ML
INJECTION, SOLUTION INTRAVENOUS PRN
Status: CANCELLED | OUTPATIENT
Start: 2023-04-10

## 2023-04-10 RX ORDER — ONDANSETRON 2 MG/ML
4 INJECTION INTRAMUSCULAR; INTRAVENOUS EVERY 6 HOURS PRN
Status: CANCELLED | OUTPATIENT
Start: 2023-04-10

## 2023-04-10 RX ORDER — DEXTROSE MONOHYDRATE 100 MG/ML
INJECTION, SOLUTION INTRAVENOUS CONTINUOUS PRN
Status: CANCELLED | OUTPATIENT
Start: 2023-04-10

## 2023-04-10 RX ORDER — POTASSIUM CHLORIDE 7.45 MG/ML
10 INJECTION INTRAVENOUS PRN
Status: CANCELLED | OUTPATIENT
Start: 2023-04-10

## 2023-04-10 RX ORDER — POLYETHYLENE GLYCOL 3350 17 G/17G
17 POWDER, FOR SOLUTION ORAL DAILY PRN
Status: CANCELLED | OUTPATIENT
Start: 2023-04-10

## 2023-04-10 RX ORDER — ACETAMINOPHEN 325 MG/1
650 TABLET ORAL EVERY 6 HOURS PRN
Status: CANCELLED | OUTPATIENT
Start: 2023-04-10

## 2023-04-10 RX ORDER — ONDANSETRON 4 MG/1
4 TABLET, ORALLY DISINTEGRATING ORAL EVERY 8 HOURS PRN
Status: CANCELLED | OUTPATIENT
Start: 2023-04-10

## 2023-04-10 RX ORDER — INSULIN LISPRO 100 [IU]/ML
0-4 INJECTION, SOLUTION INTRAVENOUS; SUBCUTANEOUS NIGHTLY
Status: CANCELLED | OUTPATIENT
Start: 2023-04-10

## 2023-04-10 RX ORDER — 0.9 % SODIUM CHLORIDE 0.9 %
1000 INTRAVENOUS SOLUTION INTRAVENOUS ONCE
Status: DISCONTINUED | OUTPATIENT
Start: 2023-04-10 | End: 2023-04-10

## 2023-04-10 RX ORDER — INSULIN LISPRO 100 [IU]/ML
0-4 INJECTION, SOLUTION INTRAVENOUS; SUBCUTANEOUS
Status: CANCELLED | OUTPATIENT
Start: 2023-04-10

## 2023-04-10 RX ORDER — PREGABALIN 25 MG/1
25 CAPSULE ORAL 2 TIMES DAILY
Status: CANCELLED | OUTPATIENT
Start: 2023-04-10

## 2023-04-10 RX ORDER — ENOXAPARIN SODIUM 100 MG/ML
30 INJECTION SUBCUTANEOUS DAILY
Status: CANCELLED | OUTPATIENT
Start: 2023-04-10

## 2023-04-10 RX ORDER — 0.9 % SODIUM CHLORIDE 0.9 %
1000 INTRAVENOUS SOLUTION INTRAVENOUS ONCE
Status: COMPLETED | OUTPATIENT
Start: 2023-04-10 | End: 2023-04-10

## 2023-04-10 RX ORDER — ACETAMINOPHEN 650 MG/1
650 SUPPOSITORY RECTAL EVERY 6 HOURS PRN
Status: CANCELLED | OUTPATIENT
Start: 2023-04-10

## 2023-04-10 RX ORDER — FUROSEMIDE 10 MG/ML
40 INJECTION INTRAMUSCULAR; INTRAVENOUS 2 TIMES DAILY
Status: CANCELLED | OUTPATIENT
Start: 2023-04-10

## 2023-04-10 RX ADMIN — SODIUM CHLORIDE 1000 ML: 9 INJECTION, SOLUTION INTRAVENOUS at 12:32

## 2023-04-10 ASSESSMENT — PAIN - FUNCTIONAL ASSESSMENT: PAIN_FUNCTIONAL_ASSESSMENT: NONE - DENIES PAIN

## 2023-04-10 NOTE — ED PROVIDER NOTES
[facility]  ED  EMERGENCY DEPARTMENT ENCOUNTER        Patient Name: Tony Veliz  MRN: [id number]  [place] 1980  Date of evaluation: 4/10/2023  Provider: Rehana Gramajo MD  PCP: Alea Macias MD  Note Started: 2:25 PM EDT 4/10/23    I independently examined and evaluated Mohini Graves. I personally saw the patient and performed a substantive portion of the visit including all aspects of the medical decision making. I am the primary physician of record. CHIEF COMPLAINT  Hypertension       HISTORY OF PRESENT ILLNESS  History from : Patient    Limitations to history : None    In brief, Tony Veliz is a 43 y.o. female  has a past medical history of Abnormal Pap smear of cervix, CHF (congestive heart failure) ([facility] [address] COVID-19 (09/22/2021), Diabetes mellitus ([facility] [address] Endometriosis, Hyperlipidemia, Hypertension, and Interstitial cystitis. , who presents to the ED complaining of hypertension. Patient report has been red blood from the past 4 days and has been running high. Had a recent increase in her lisinopril from 10 mg --> 20 mg. Patient has multiple vague complaints. Reports intermittent lightheadedness without positional component. She reports she intermittently has some chest pressure, none today. Patient reports leg restlessness at night for 1-1/2 years, unchanged from baseline. She denies headache, numbness, weakness, vertiginous symptoms. REVIEW OF SYSTEMS  All systems reviewed, pertinent positives per HPI otherwise noted to be negative. Focused exam revealed   PHYSICAL EXAM  ED Triage Vitals [04/10/23 1139]   BP Temp Temp Source Heart Rate Resp SpO2 Height Weight   (!) 160/109 98.1 °F (36.7 °C) Oral (!) 103 20 100 % 5' 2\" (1.575 m) 98 lb (44.5 kg)     GENERAL APPEARANCE: Awake and alert. Cooperative. no distress. HENT: Normocephalic. Atraumatic. Mucous membranes are moist  NECK: Supple. Full range of motion of the neck without stiffness or pain.   EYES:

## 2023-04-10 NOTE — ED PROVIDER NOTES
[address] [facility]  ED  EMERGENCY DEPARTMENT ENCOUNTER        Pt Name: Milly De Souza  MRN: [id number]  [place] 1980  Date of evaluation: 4/10/2023  Provider: Janeane Mcardle, PA-C  PCP: Shima Collazo MD  ED Attending: Oswald Grider MD       I have seen and evaluated this patient with my supervising physician Oswald Grider MD.    CHIEF COMPLAINT:     Chief Complaint   Patient presents with    Hypertension     Pt reporting she's been checking her BP for the past four days and states it's been running high. States her Lisinopril recently was increased from 10mg to 20mg. HISTORY OF PRESENT ILLNESS:      History provided by the patient. No limitations. Dora Graves is a 43 y.o. female who arrives to the ED by private vehicle. Patient here with a few different complaints. She is concerned about her blood pressure. Has longstanding history of hypertension and reports it has been elevated for \"some time\". She saw PCP this past Thursday, 4/6. She reports having her lisinopril increased from 10 mg daily to 20 mg daily. She has not noted a difference and continues to have high blood pressure readings at home which are generally in the 150s to 160s over low 100s. Her second complaint is that of bilateral lower extremity pain. She reports she has had pain for a while and has undergone outpatient work-up. She reports having had EMG performed. She has had lab work. She was recently prescribed Lyrica but reports after taking a single dose she \"could not get out of bed\". She stopped taking it and called her doctor. There is no swelling to her legs. No skin changes. Symptoms are worse at night. She is hoping we can address this chronic leg pain for her today. Her third and last complaint is that of \"just not feeling well\". She states she has had episodes of feeling lightheaded including while in the shower earlier.   She reports she did not feel comfortable driving here because she said her

## 2023-04-12 NOTE — H&P
Patient left AMA before being seen or before orders released. Please refer to ED note for further details.

## 2023-06-02 ENCOUNTER — OFFICE VISIT (OUTPATIENT)
Dept: OBGYN CLINIC | Age: 43
End: 2023-06-02
Payer: COMMERCIAL

## 2023-06-02 VITALS
SYSTOLIC BLOOD PRESSURE: 100 MMHG | HEIGHT: 62 IN | DIASTOLIC BLOOD PRESSURE: 64 MMHG | BODY MASS INDEX: 16.86 KG/M2 | TEMPERATURE: 97.3 F | WEIGHT: 91.6 LBS

## 2023-06-02 DIAGNOSIS — Z12.31 BREAST CANCER SCREENING BY MAMMOGRAM: ICD-10-CM

## 2023-06-02 DIAGNOSIS — Z98.890 HISTORY OF LOOP ELECTRICAL EXCISION PROCEDURE (LEEP): ICD-10-CM

## 2023-06-02 DIAGNOSIS — Z12.4 PAP SMEAR FOR CERVICAL CANCER SCREENING: ICD-10-CM

## 2023-06-02 DIAGNOSIS — N87.1 DYSPLASIA OF CERVIX, HIGH GRADE CIN 2: ICD-10-CM

## 2023-06-02 DIAGNOSIS — Z01.419 ENCNTR FOR GYN EXAM (GENERAL) (ROUTINE) W/O ABN FINDINGS: Primary | ICD-10-CM

## 2023-06-02 PROCEDURE — 99396 PREV VISIT EST AGE 40-64: CPT | Performed by: OBSTETRICS & GYNECOLOGY

## 2023-06-02 RX ORDER — SPIRONOLACTONE 25 MG/1
TABLET ORAL
COMMUNITY
Start: 2023-04-13

## 2023-06-02 RX ORDER — SACUBITRIL AND VALSARTAN 24; 26 MG/1; MG/1
TABLET, FILM COATED ORAL
COMMUNITY
Start: 2023-04-13

## 2023-06-02 RX ORDER — DAPAGLIFLOZIN 10 MG/1
TABLET, FILM COATED ORAL
COMMUNITY
Start: 2023-04-13

## 2023-06-02 ASSESSMENT — ENCOUNTER SYMPTOMS
NAUSEA: 0
DIARRHEA: 0
CONSTIPATION: 0
VOMITING: 0
ABDOMINAL PAIN: 0
COUGH: 0
SORE THROAT: 0
SHORTNESS OF BREATH: 0

## 2023-06-06 LAB
HPV HR 12 DNA SPEC QL NAA+PROBE: NOT DETECTED
HPV16 DNA SPEC QL NAA+PROBE: NOT DETECTED
HPV16+18+H RISK 12 DNA SPEC-IMP: NORMAL
HPV18 DNA SPEC QL NAA+PROBE: NOT DETECTED

## 2023-12-13 ENCOUNTER — HOSPITAL ENCOUNTER (EMERGENCY)
Age: 43
Discharge: HOME | End: 2023-12-13
Payer: COMMERCIAL

## 2023-12-13 ENCOUNTER — APPOINTMENT (OUTPATIENT)
Dept: CT IMAGING | Age: 43
End: 2023-12-13
Payer: COMMERCIAL

## 2023-12-13 VITALS
WEIGHT: 100 LBS | BODY MASS INDEX: 18.29 KG/M2 | DIASTOLIC BLOOD PRESSURE: 92 MMHG | TEMPERATURE: 97.8 F | SYSTOLIC BLOOD PRESSURE: 129 MMHG | HEART RATE: 94 BPM | OXYGEN SATURATION: 100 % | RESPIRATION RATE: 18 BRPM

## 2023-12-13 DIAGNOSIS — S09.90XA CLOSED HEAD INJURY, INITIAL ENCOUNTER: Primary | ICD-10-CM

## 2023-12-13 DIAGNOSIS — F07.81 POSTCONCUSSIVE SYNDROME: ICD-10-CM

## 2023-12-13 PROCEDURE — 70450 CT HEAD/BRAIN W/O DYE: CPT

## 2023-12-13 PROCEDURE — 99284 EMERGENCY DEPT VISIT MOD MDM: CPT

## 2023-12-13 RX ORDER — ONDANSETRON 4 MG/1
4 TABLET, FILM COATED ORAL EVERY 8 HOURS PRN
Qty: 20 TABLET | Refills: 0 | Status: SHIPPED | OUTPATIENT
Start: 2023-12-13

## 2023-12-13 ASSESSMENT — PAIN - FUNCTIONAL ASSESSMENT
PAIN_FUNCTIONAL_ASSESSMENT: NONE - DENIES PAIN
PAIN_FUNCTIONAL_ASSESSMENT: 0-10

## 2023-12-13 ASSESSMENT — PAIN SCALES - GENERAL: PAINLEVEL_OUTOF10: 7

## 2023-12-13 NOTE — ED NOTES
AVS given and reviewed with patient and/or family. Opportunity to ask questions was given, questions were either not asked or answered. Appropriate follow up care listed. Patient and/or family verbalized understanding. Return precautions discussed and understood. No further needs voiced, patient OK for discharge.         Sharad Huitron RN  12/13/23 5080

## 2023-12-14 NOTE — ED PROVIDER NOTES
[address] [place] [address]  ED  EMERGENCY DEPARTMENT ENCOUNTER        Pt Name: Eddie Jacome  MRN: [id number]  [address] [facility] 1980  Date of evaluation: 12/13/2023  Provider: MARSHA Lyles - ANKUR  PCP: Lamont Colindres MD        NOEMY. I have evaluated this patient. CHIEF COMPLAINT       Chief Complaint   Patient presents with    Fall     Outside yesterday morning smoking, legs were shaking because it was so cold and legs gave out, hit head, not sure if she was knocked out, was dizzy this am, nauseated, also injured left hip       HISTORY OF PRESENT ILLNESS: 1 or more Elements     History From: the patient  Limitations to history : None    Eddie Jacome is a 37 y.o. female who presents to the emerged from today with complaints of a fall, patient was outside smoking when she she fell, states that she hit her head, not sure if she was knocked out or not but states this morning she was dizzy, she also complained of left hip pain but is been able to ambulate without difficulty. She is here for the evaluation. Nursing Notes were all reviewed and agreed with or any disagreements were addressed in the HPI. REVIEW OF SYSTEMS :      Review of Systems    Positives and Pertinent negatives as per HPI.      SURGICAL HISTORY     Past Surgical History:   Procedure Laterality Date    BREAST SURGERY Bilateral 2012    implants    CHOLECYSTECTOMY      ENDOMETRIAL ABLATION      LEEP N/A 4/28/2022    LOOP ELECTROSURGICAL EXCISION PROCEDURE performed by Cassandra Domingo DO at [address] [place] [address]  3/22/11    irrigation and debridement of right vulva       CURRENTMEDICATIONS       Discharge Medication List as of 12/13/2023  9:35 AM        CONTINUE these medications which have NOT CHANGED    Details   FARXIGA 10 MG tablet DAWHistorical Med      ENTRESTO 24-26 MG per tablet DAWHistorical Med      spironolactone (ALDACTONE) 25 MG tablet Historical Med      Cyanocobalamin (VITAMIN B-12 PO) Take by

## 2023-12-22 PROBLEM — I42.1 HOCM (HYPERTROPHIC OBSTRUCTIVE CARDIOMYOPATHY) (HCC): Status: ACTIVE | Noted: 2023-12-22

## 2024-05-29 ENCOUNTER — TELEPHONE (OUTPATIENT)
Dept: CARDIAC REHAB | Age: 44
End: 2024-05-29

## 2024-05-29 NOTE — TELEPHONE ENCOUNTER
Patient rescheduled pulmonary rehab initial assessment to 6/9/24 at 11:15 am. Patient is not sure why she was referred she does not have COPD.  MD note reports patient has restrictive lung disease. Patient going to [facility] due to heart problems. Patient reports she is unable to get her heart rate up. Patient is concerned about how she would be able to exercise.

## 2024-05-30 ENCOUNTER — HOSPITAL ENCOUNTER (OUTPATIENT)
Dept: CARDIAC REHAB | Age: 44
Setting detail: THERAPIES SERIES
Discharge: HOME | End: 2024-05-30

## 2024-06-18 ASSESSMENT — PULMONARY FUNCTION TESTS
FEV1/FVC: 76
FEV1 (%PREDICTED): 67

## 2024-06-19 ENCOUNTER — HOSPITAL ENCOUNTER (OUTPATIENT)
Dept: CARDIAC REHAB | Age: 44
Setting detail: THERAPIES SERIES
Discharge: HOME | End: 2024-06-19
Payer: COMMERCIAL

## 2024-06-19 LAB
GLUCOSE BLD-MCNC: 154 MG/DL (ref 70–99)
PERFORMED ON: ABNORMAL

## 2024-06-19 PROCEDURE — 94625 PHY/QHP OP PULM RHB W/O MNTR: CPT

## 2024-06-19 ASSESSMENT — PATIENT HEALTH QUESTIONNAIRE - PHQ9
SUM OF ALL RESPONSES TO PHQ QUESTIONS 1-9: 12
8. MOVING OR SPEAKING SO SLOWLY THAT OTHER PEOPLE COULD HAVE NOTICED. OR THE OPPOSITE, BEING SO FIGETY OR RESTLESS THAT YOU HAVE BEEN MOVING AROUND A LOT MORE THAN USUAL: NEARLY EVERY DAY
2. FEELING DOWN, DEPRESSED OR HOPELESS: SEVERAL DAYS
SUM OF ALL RESPONSES TO PHQ QUESTIONS 1-9: 12
5. POOR APPETITE OR OVEREATING: MORE THAN HALF THE DAYS
1. LITTLE INTEREST OR PLEASURE IN DOING THINGS: SEVERAL DAYS
4. FEELING TIRED OR HAVING LITTLE ENERGY: SEVERAL DAYS
10. IF YOU CHECKED OFF ANY PROBLEMS, HOW DIFFICULT HAVE THESE PROBLEMS MADE IT FOR YOU TO DO YOUR WORK, TAKE CARE OF THINGS AT HOME, OR GET ALONG WITH OTHER PEOPLE: NOT DIFFICULT AT ALL
3. TROUBLE FALLING OR STAYING ASLEEP: SEVERAL DAYS
9. THOUGHTS THAT YOU WOULD BE BETTER OFF DEAD, OR OF HURTING YOURSELF: NOT AT ALL
SUM OF ALL RESPONSES TO PHQ9 QUESTIONS 1 & 2: 2
SUM OF ALL RESPONSES TO PHQ QUESTIONS 1-9: 12
6. FEELING BAD ABOUT YOURSELF - OR THAT YOU ARE A FAILURE OR HAVE LET YOURSELF OR YOUR FAMILY DOWN: SEVERAL DAYS
SUM OF ALL RESPONSES TO PHQ QUESTIONS 1-9: 12

## 2024-06-19 ASSESSMENT — EXERCISE STRESS TEST
PEAK_HR: 99
PEAK_BP: 142/60

## 2024-06-19 ASSESSMENT — LIFESTYLE VARIABLES
ALCOHOL_TYPE: BOURBON
CIGARETTES_PER_DAY: 20
ALCOHOL_USE: SPECIAL
SMOKELESS_TOBACCO: NO

## 2024-06-19 NOTE — PROGRESS NOTES
6/19/2024    11:00 AM   PHQ-9    Little interest or pleasure in doing things 1   Feeling down, depressed, or hopeless 1   Trouble falling or staying asleep, or sleeping too much 1   Feeling tired or having little energy 1   Poor appetite or overeating 2   Feeling bad about yourself - or that you are a failure or have let yourself or your family down 1   Trouble concentrating on things, such as reading the newspaper or watching television 2   Moving or speaking so slowly that other people could have noticed. Or the opposite - being so fidgety or restless that you have been moving around a lot more than usual 3   Thoughts that you would be better off dead, or of hurting yourself in some way 0   PHQ-2 Score 2   PHQ-9 Total Score 12   If you checked off any problems, how difficult have these problems made it for you to do your work, take care of things at home, or get along with other people? 0

## 2024-06-19 NOTE — PLAN OF CARE
healthier choices.  Nutrition Goal Assessment: Recommendations      Provider Review and Approval of this ITP    The above treatment plan and goals have been set for your patient during Cardiac Rehabilitation. Please review and Electronically Cosign        Electronically signed by Sully Mcnair RN on 6/19/24 at 1:21 PM EDT

## 2025-03-07 ENCOUNTER — OFFICE VISIT (OUTPATIENT)
Dept: OBGYN CLINIC | Age: 45
End: 2025-03-07
Payer: COMMERCIAL

## 2025-03-07 VITALS
BODY MASS INDEX: 17.3 KG/M2 | DIASTOLIC BLOOD PRESSURE: 88 MMHG | OXYGEN SATURATION: 98 % | HEIGHT: 62 IN | WEIGHT: 94 LBS | HEART RATE: 85 BPM | SYSTOLIC BLOOD PRESSURE: 132 MMHG

## 2025-03-07 DIAGNOSIS — N90.7 VULVAR CYST: Primary | ICD-10-CM

## 2025-03-07 PROCEDURE — 99213 OFFICE O/P EST LOW 20 MIN: CPT | Performed by: OBSTETRICS & GYNECOLOGY

## 2025-03-08 RX ORDER — CEPHALEXIN 500 MG/1
500 CAPSULE ORAL 2 TIMES DAILY
Qty: 14 CAPSULE | Refills: 0 | Status: SHIPPED | OUTPATIENT
Start: 2025-03-08 | End: 2025-03-15

## 2025-03-25 ENCOUNTER — OFFICE VISIT (OUTPATIENT)
Dept: OBGYN CLINIC | Age: 45
End: 2025-03-25
Payer: COMMERCIAL

## 2025-03-25 VITALS
SYSTOLIC BLOOD PRESSURE: 110 MMHG | WEIGHT: 91.2 LBS | DIASTOLIC BLOOD PRESSURE: 64 MMHG | TEMPERATURE: 97.7 F | HEIGHT: 62 IN | BODY MASS INDEX: 16.78 KG/M2

## 2025-03-25 DIAGNOSIS — Z98.890 HISTORY OF LOOP ELECTROSURGICAL EXCISION PROCEDURE (LEEP): ICD-10-CM

## 2025-03-25 DIAGNOSIS — M25.559 HIP PAIN, UNSPECIFIED LATERALITY: ICD-10-CM

## 2025-03-25 DIAGNOSIS — Z12.4 PAP SMEAR FOR CERVICAL CANCER SCREENING: ICD-10-CM

## 2025-03-25 DIAGNOSIS — Z12.31 ENCOUNTER FOR SCREENING MAMMOGRAM FOR MALIGNANT NEOPLASM OF BREAST: ICD-10-CM

## 2025-03-25 DIAGNOSIS — Z01.419 ENCNTR FOR GYN EXAM (GENERAL) (ROUTINE) W/O ABN FINDINGS: Primary | ICD-10-CM

## 2025-03-25 DIAGNOSIS — N87.1 DYSPLASIA OF CERVIX, HIGH GRADE CIN 2: ICD-10-CM

## 2025-03-25 PROCEDURE — 99396 PREV VISIT EST AGE 40-64: CPT | Performed by: OBSTETRICS & GYNECOLOGY

## 2025-03-25 ASSESSMENT — PATIENT HEALTH QUESTIONNAIRE - PHQ9
SUM OF ALL RESPONSES TO PHQ QUESTIONS 1-9: 0
2. FEELING DOWN, DEPRESSED OR HOPELESS: NOT AT ALL
SUM OF ALL RESPONSES TO PHQ QUESTIONS 1-9: 0
1. LITTLE INTEREST OR PLEASURE IN DOING THINGS: NOT AT ALL
SUM OF ALL RESPONSES TO PHQ QUESTIONS 1-9: 0
SUM OF ALL RESPONSES TO PHQ QUESTIONS 1-9: 0

## 2025-03-25 NOTE — PROGRESS NOTES
Annual Exam      CC:   Chief Complaint   Patient presents with    Annual Exam       HPI:  44 y.o.  presents for her gynecologic annual exam.    Patient seen and examined. Patient is doing well today.      Reports menses are regular, occurring monthly, however her last cycle was a week early.  Typically last for 5 days.  Reports moderate flow.  Hx of painful menses.  Reports hx of laparoscopic confirmed endometriosis. Reports significant for lysis of adhesions related to such.  Has had some hip pain since her laparoscopy.      Has had urinary tract infections since starting Farxiga.  Is also having some vaginal dryness.  Was advised to decrease soda use to help UTIs.      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  Has been advised of potential for heart transplant in the next 5 years.  Is being worked up for MS.    Health Maintenance:  Birth control: None currently   Pregnancy plans: None currently   Safe relationship: Single   Diet: no specific plan   Exercise: is doing PT for LE stiffness and pain with walking    Screening:  Last pap smear: 2023 - NILM, HR HPV negative  History of abnormal pap smears:    - In early 20s she had an abnormal pap smear with HPV, denies biopsies   - 2022 - LSIL, HR HPV positive   - 2022 - Colposcopy: HGSIL   - 2022 - LEEP - Endocervix CIN2, negative margins  Mammogram: Has not had - has had US due to ruptured implant capsule, has recommended a mammogram and has not had - is following with Daniel  Colonoscopy: Had one in early 20s when her grandfather passed from Colon cancer     Vaccines:  Flu vaccine: Has not had   COVID-19 vaccine: Has not had     Review of Systems:   Review of Systems   Constitutional:  Negative for chills and fever.   HENT:  Negative for congestion and sore throat.    Respiratory:  Negative for cough and shortness of breath.    Cardiovascular:  Negative for chest pain and

## 2025-03-28 ENCOUNTER — RESULTS FOLLOW-UP (OUTPATIENT)
Dept: OBGYN CLINIC | Age: 45
End: 2025-03-28

## 2025-05-19 ENCOUNTER — HOSPITAL ENCOUNTER (OUTPATIENT)
Dept: PHYSICAL THERAPY | Age: 45
Setting detail: THERAPIES SERIES
Discharge: HOME | End: 2025-05-19
Payer: COMMERCIAL

## 2025-05-19 DIAGNOSIS — R29.898 LEG WEAKNESS, BILATERAL: ICD-10-CM

## 2025-05-19 DIAGNOSIS — M53.3 SACROILIAC JOINT DYSFUNCTION: Primary | ICD-10-CM

## 2025-05-19 PROCEDURE — 97530 THERAPEUTIC ACTIVITIES: CPT

## 2025-05-19 PROCEDURE — 97140 MANUAL THERAPY 1/> REGIONS: CPT

## 2025-05-19 PROCEDURE — 97161 PT EVAL LOW COMPLEX 20 MIN: CPT

## 2025-05-19 NOTE — PLAN OF CARE
[facility] - Outpatient Rehabilitation and Therapy: [address] [place] [address] office: [phone] fax: [phone]     Physical Therapy Initial Evaluation Certification      Dear Nneka Whitt DO,    We had the pleasure of evaluating the following patient for physical therapy services at [facility].  A summary of our findings can be found in the initial assessment below.  This includes our plan of care.  If you have any questions or concerns regarding these findings, please do not hesitate to contact me at the office phone number listed above.  Thank you for the referral.     Physician Signature:_______________________________Date:__________________  By signing above (or electronic signature), therapist’s plan is approved by physician       Physical Therapy: TREATMENT/PROGRESS NOTE   Patient: Misty Graves (44 y.o. female)   Examination Date: 2025   :  1980 MRN: [id number]   Visit #: 1   Insurance Allowable Auth Needed   25 [x]Yes    []No    Insurance: Pay[place]: [place] BC[place] / Plan: [place] / Product Type: *No Product type* /   Insurance ID: [id number] - ([facility])  Secondary Insurance (if applicable):    Treatment Diagnosis:     ICD-10-CM    1. Sacroiliac joint dysfunction  M53.3       2. Leg weakness, bilateral  R29.898          Medical Diagnosis:  Hip pain, unspecified laterality [M25.559]   Referring Physician: Nneka Whitt DO  PCP: Juan Jose Caruso MD   Plan of care signed (Y/N): N    Date of Patient follow up with Physician: PRN     Plan of Care Report: EVAL today  POC update due: (10 visits /OR AUTH LIMITS, whichever is less)  2025                                             Medical History:  Comorbidities:  Diabetes (Type I or II)  Hypertension  Other Cardiovascular Conditions: CHF  Relevant Medical History: see chart                                          Precautions/ Contra-indications:

## 2025-06-12 ENCOUNTER — HOSPITAL ENCOUNTER (OUTPATIENT)
Dept: PHYSICAL THERAPY | Age: 45
Setting detail: THERAPIES SERIES
Discharge: HOME | End: 2025-06-12
Payer: COMMERCIAL

## 2025-06-12 PROCEDURE — 97110 THERAPEUTIC EXERCISES: CPT

## 2025-06-12 PROCEDURE — 97140 MANUAL THERAPY 1/> REGIONS: CPT

## 2025-06-12 NOTE — PLAN OF CARE
goals/ Treatment Progress Update:  [] Patient is progressing as expected towards functional goals listed.    [] Progression is slowed due to complexities/Impairments listed.  [] Progression has been slowed due to co-morbidities.  [x] Plan just implemented, too soon (<30days) to assess goals progression   [] Goals require adjustment due to lack of progress  [] Patient is not progressing as expected and requires additional follow up with physician  [] Other:     TREATMENT PLAN     Frequency/Duration: 2x/week for 4-6 weeks for the following treatment interventions:    Interventions:  Therapeutic Exercise (38623) including: strength training, ROM, and functional mobility  Therapeutic Activities (49415) including: functional mobility training and education.  Neuromuscular Re-education (13894) activation and proprioception, including postural re-education.    Gait Training (87341) for normalization of ambulation patterns and AD training.   Manual Therapy (32256) as indicated to include: Gr I-IV mobilizations, Soft Tissue Mobilization, Trigger Point Release, and Myofascial Release  Patient education on postural re-education    Plan: Cont POC- Continue emphasis/focus on improving proper muscle recruitment and activation/motor control patterns, promoting relaxation, and allowing for proper ROM. Next visit plan to continue current phase     Electronically Signed by Samantha Hernandez PT  Date: 06/12/2025     Note: Portions of this note have been templated and/or copied from initial evaluation, reassessments and prior notes for documentation efficiency.    Note: If patient does not return for scheduled/recommended follow up visits, this note will serve as a discharge from care along with the most recent update on progress.

## 2025-06-19 ENCOUNTER — HOSPITAL ENCOUNTER (OUTPATIENT)
Dept: PHYSICAL THERAPY | Age: 45
Setting detail: THERAPIES SERIES
Discharge: HOME | End: 2025-06-19
Payer: COMMERCIAL

## 2025-06-19 PROCEDURE — 97140 MANUAL THERAPY 1/> REGIONS: CPT

## 2025-06-19 PROCEDURE — 97110 THERAPEUTIC EXERCISES: CPT

## 2025-06-19 NOTE — PLAN OF CARE
[facility] - Outpatient Rehabilitation and Therapy: [address] [place] [address] office: [phone] fax: [phone]      Physical Therapy: TREATMENT/PROGRESS NOTE   Patient: Misty Garves (44 y.o. female)   Examination Date: 2025   :  1980 MRN: [id number]   Visit #: 3   Insurance Allowable Auth Needed   25 [x]Yes    []No    Insurance: Pay[place] / Plan[place] / Product Type: *No Product type* /   Insurance ID: [id number] - ([employer])  Secondary Insurance (if applicable):    Treatment Diagnosis:     ICD-10-CM    1. Sacroiliac joint dysfunction  M53.3       2. Leg weakness, bilateral  R29.898          Medical Diagnosis:  Hip pain, unspecified laterality [M25.559]   Referring Physician: Nneka Whitt DO  PCP: Juan Jose Caruso MD   Plan of care signed (Y/N): Y    Date of Patient follow up with Physician: PRN     Plan of Care Report: NO  POC update due: (10 visits /OR AUTH LIMITS, whichever is less)  2025                                             Medical History:  Comorbidities:  Diabetes (Type I or II)  Hypertension  Other Cardiovascular Conditions: CHF  Relevant Medical History: see chart                                          Precautions/ Contra-indications:           Latex allergy:  NO  Pacemaker:    NO  Contraindications for Manipulation: None    Red Flags:  None    Suicide Screening:   The patient did not verbalize a primary behavioral concern, suicidal ideation, suicidal intent, or demonstrate suicidal behaviors.    Preferred Language for Healthcare:   [x] English       [] other:    SUBJECTIVE EXAMINATION     Patient stated complaint/comments: Reports she has been trying to be compliant with exercises. Wants exercises to do at work. Feels that her pain is worse if she is sitting all day, so usually worse on days she works.     From Eval 25: Patient states that 3 years ago she had cancer cells on cervix. Had cancerous cells

## 2025-06-24 ENCOUNTER — HOSPITAL ENCOUNTER (OUTPATIENT)
Dept: PHYSICAL THERAPY | Age: 45
Setting detail: THERAPIES SERIES
Discharge: HOME | End: 2025-06-24
Payer: COMMERCIAL

## 2025-06-24 PROCEDURE — 97110 THERAPEUTIC EXERCISES: CPT

## 2025-06-24 PROCEDURE — 97140 MANUAL THERAPY 1/> REGIONS: CPT

## 2025-06-24 NOTE — FLOWSHEET NOTE
symptoms or restriction.   [] Progressing: [] Met: [] Not Met: [] Adjusted  4. Patient will be able to go for a walk without increased pain in hip.     [] Progressing: [] Met: [] Not Met: [] Adjusted     Overall Progression Towards Functional goals/ Treatment Progress Update:  [] Patient is progressing as expected towards functional goals listed.    [] Progression is slowed due to complexities/Impairments listed.  [] Progression has been slowed due to co-morbidities.  [x] Plan just implemented, too soon (<30days) to assess goals progression   [] Goals require adjustment due to lack of progress  [] Patient is not progressing as expected and requires additional follow up with physician  [] Other:     TREATMENT PLAN     Frequency/Duration: 2x/week for 4-6 weeks for the following treatment interventions:    Interventions:  Therapeutic Exercise (52970) including: strength training, ROM, and functional mobility  Therapeutic Activities (39562) including: functional mobility training and education.  Neuromuscular Re-education (09235) activation and proprioception, including postural re-education.    Gait Training (45471) for normalization of ambulation patterns and AD training.   Manual Therapy (32846) as indicated to include: Gr I-IV mobilizations, Soft Tissue Mobilization, Trigger Point Release, and Myofascial Release  Patient education on postural re-education    Plan: Cont POC- Continue emphasis/focus on improving proper muscle recruitment and activation/motor control patterns, promoting relaxation, and allowing for proper ROM. Next visit plan to continue current phase     Electronically Signed by Samantha Hernandez PT  Date: 06/24/2025     Note: Portions of this note have been templated and/or copied from initial evaluation, reassessments and prior notes for documentation efficiency.    Note: If patient does not return for scheduled/recommended follow up visits, this note will serve as a discharge from care along with

## (undated) DEVICE — GLOVE SURG SZ 65 THK91MIL LTX FREE SYN POLYISOPRENE

## (undated) DEVICE — PVC URETHRAL CATHETER: Brand: DOVER

## (undated) DEVICE — TRAY PROC INTRACERVICAL LEEP DISPOSABLE REDIKIT

## (undated) DEVICE — SWAB PROCTOSCOPIC ST 8 IN

## (undated) DEVICE — MINOR SET UP PACK: Brand: MEDLINE INDUSTRIES, INC.

## (undated) DEVICE — ELECTRODE PT RET AD L9FT HI MOIST COND ADH HYDRGEL CORDED

## (undated) DEVICE — TUBING ELECSURG SPECLM W/ ADPT DISP

## (undated) DEVICE — ELECTRODE LOOP 10X10MM SHFT L5IN DIA3/32IN STD LEEP LLETZ

## (undated) DEVICE — INVIEW CLEAR LEGGINGS: Brand: CONVERTORS

## (undated) DEVICE — PAD,NON-ADHERENT,3X8,STERILE,LF,1/PK: Brand: MEDLINE

## (undated) DEVICE — CONTROL SYRINGE LUER-LOCK TIP: Brand: MONOJECT

## (undated) DEVICE — PENCIL ES CRD L10FT HND SWCHING ROCK SWCH W/ EDGE COAT BLDE

## (undated) DEVICE — GLOVE,SURG,SENSICARE,ALOE,LF,PF,6: Brand: MEDLINE

## (undated) DEVICE — ELECTRODE LOOP 12X20MM SHFT L5IN DIA3/32IN LEEP LLETZ

## (undated) DEVICE — SUTURE VCRL SZ 0 L36IN ABSRB UD L36MM CT-1 1/2 CIR J946H

## (undated) DEVICE — TRAY PREP DRY W/ PREM GLV 2 APPL 6 SPNG 2 UNDPD 1 OVERWRAP

## (undated) DEVICE — GLOVE,SURG,SENSICARE,ALOE,LF,PF,7: Brand: MEDLINE

## (undated) DEVICE — ELECTRODE BALL DIA5MM TUNGSTEN LLETZ DURABLE RESIST

## (undated) DEVICE — Z DISCONTINUED USE 2272117 DRAPE SURG 3 QTR N INVASIVE 2 LAYR DISP

## (undated) DEVICE — UNDERPAD HOSP W30XL36IN GRN POLYPR HI ABSRB DISP

## (undated) DEVICE — DRAPE,UNDERBUTTOCKS,PCH,STERILE: Brand: MEDLINE

## (undated) DEVICE — TUBING SMK EVAC L10FT OD7/8IN L BOR W/ N COND COAT

## (undated) DEVICE — GLOVE SURG SZ 65 L12IN FNGR THK94MIL STD WHT LTX FREE

## (undated) DEVICE — NEEDLE SPNL 22GA L3.5IN BLK HUB S STL REG WALL FIT STYL W/